# Patient Record
Sex: MALE | Race: WHITE | NOT HISPANIC OR LATINO | Employment: OTHER | URBAN - METROPOLITAN AREA
[De-identification: names, ages, dates, MRNs, and addresses within clinical notes are randomized per-mention and may not be internally consistent; named-entity substitution may affect disease eponyms.]

---

## 2023-09-01 ENCOUNTER — TELEPHONE (OUTPATIENT)
Dept: INTERNAL MEDICINE | Age: 83
End: 2023-09-01

## 2023-09-07 ENCOUNTER — E-ADVICE (OUTPATIENT)
Dept: INTERNAL MEDICINE | Age: 83
End: 2023-09-07

## 2023-09-07 RX ORDER — PHENOBARBITAL 60 MG/1
180 TABLET ORAL NIGHTLY
Qty: 90 TABLET | Refills: 0 | Status: CANCELLED | OUTPATIENT
Start: 2023-09-07

## 2023-09-07 RX ORDER — METOPROLOL SUCCINATE 50 MG/1
50 TABLET, EXTENDED RELEASE ORAL DAILY
Qty: 30 TABLET | Refills: 0 | Status: CANCELLED | OUTPATIENT
Start: 2023-09-07

## 2023-09-25 ENCOUNTER — TELEPHONE (OUTPATIENT)
Dept: FAMILY MEDICINE | Age: 83
End: 2023-09-25

## 2023-09-26 ENCOUNTER — APPOINTMENT (OUTPATIENT)
Dept: FAMILY MEDICINE | Age: 83
End: 2023-09-26

## 2023-09-27 ENCOUNTER — OFFICE VISIT (OUTPATIENT)
Dept: FAMILY MEDICINE | Age: 83
End: 2023-09-27

## 2023-09-27 VITALS
TEMPERATURE: 97.1 F | HEIGHT: 72 IN | DIASTOLIC BLOOD PRESSURE: 85 MMHG | BODY MASS INDEX: 24.5 KG/M2 | HEART RATE: 83 BPM | WEIGHT: 180.9 LBS | SYSTOLIC BLOOD PRESSURE: 145 MMHG | OXYGEN SATURATION: 97 %

## 2023-09-27 DIAGNOSIS — R56.9 NOCTURNAL SEIZURES (CMD): ICD-10-CM

## 2023-09-27 DIAGNOSIS — I10 PRIMARY HYPERTENSION: Primary | ICD-10-CM

## 2023-09-27 PROCEDURE — 99214 OFFICE O/P EST MOD 30 MIN: CPT | Performed by: STUDENT IN AN ORGANIZED HEALTH CARE EDUCATION/TRAINING PROGRAM

## 2023-09-27 RX ORDER — AMLODIPINE BESYLATE 5 MG/1
5 TABLET ORAL DAILY
Qty: 90 TABLET | Refills: 3 | Status: SHIPPED | OUTPATIENT
Start: 2023-09-27

## 2023-09-27 RX ORDER — PHENOBARBITAL 60 MG/1
1 TABLET ORAL DAILY
COMMUNITY
Start: 1983-01-01

## 2023-09-27 RX ORDER — METOPROLOL SUCCINATE 50 MG/1
50 TABLET, EXTENDED RELEASE ORAL DAILY
COMMUNITY
Start: 2023-09-26

## 2023-09-27 ASSESSMENT — ENCOUNTER SYMPTOMS
BACK PAIN: 0
EYE PAIN: 0
SORE THROAT: 0
FATIGUE: 0
EYE REDNESS: 0
NUMBNESS: 0
COUGH: 0
NAUSEA: 0
VOMITING: 0
CONSTIPATION: 0
FEVER: 0
SINUS PRESSURE: 0
HEADACHES: 0
ABDOMINAL PAIN: 0
CHILLS: 0
DIARRHEA: 0
SHORTNESS OF BREATH: 0
CHEST TIGHTNESS: 0
NERVOUS/ANXIOUS: 0

## 2024-01-11 DIAGNOSIS — R56.9 NOCTURNAL SEIZURES (CMD): Primary | ICD-10-CM

## 2024-01-15 RX ORDER — PHENOBARBITAL 60 MG/1
60 TABLET ORAL DAILY
Qty: 270 TABLET | Refills: 0 | OUTPATIENT
Start: 2024-01-15

## 2024-01-18 RX ORDER — PHENOBARBITAL 60 MG/1
60 TABLET ORAL 3 TIMES DAILY
Qty: 270 TABLET | Refills: 0 | Status: SHIPPED | OUTPATIENT
Start: 2024-01-18

## 2024-03-21 ENCOUNTER — TELEPHONE (OUTPATIENT)
Dept: INTERNAL MEDICINE | Age: 84
End: 2024-03-21

## 2024-04-25 DIAGNOSIS — R56.9 NOCTURNAL SEIZURES (CMD): ICD-10-CM

## 2024-04-25 RX ORDER — PHENOBARBITAL 60 MG/1
60 TABLET ORAL 3 TIMES DAILY
Qty: 270 TABLET | Refills: 0 | Status: SHIPPED | OUTPATIENT
Start: 2024-04-25

## 2024-05-24 ENCOUNTER — APPOINTMENT (OUTPATIENT)
Dept: CV DIAGNOSTICS | Age: 84
DRG: 322 | End: 2024-05-24
Attending: EMERGENCY MEDICINE

## 2024-05-24 ENCOUNTER — APPOINTMENT (OUTPATIENT)
Dept: GENERAL RADIOLOGY | Age: 84
DRG: 322 | End: 2024-05-24

## 2024-05-24 ENCOUNTER — HOSPITAL ENCOUNTER (INPATIENT)
Facility: HOSPITAL | Age: 84
LOS: 5 days | Discharge: HOME | DRG: 322 | End: 2024-05-29
Attending: EMERGENCY MEDICINE | Admitting: HOSPITALIST

## 2024-05-24 DIAGNOSIS — I15.9 SECONDARY HYPERTENSION: ICD-10-CM

## 2024-05-24 DIAGNOSIS — R94.39 ABNORMAL STRESS TEST: ICD-10-CM

## 2024-05-24 DIAGNOSIS — R07.9 CHEST PAIN OF UNCERTAIN ETIOLOGY: Primary | ICD-10-CM

## 2024-05-24 PROBLEM — R73.9 HYPERGLYCEMIA: Status: ACTIVE | Noted: 2024-05-24

## 2024-05-24 PROBLEM — R79.89 AZOTEMIA: Status: ACTIVE | Noted: 2024-05-24

## 2024-05-24 LAB
ALBUMIN SERPL-MCNC: 4 G/DL (ref 3.4–5)
ALBUMIN/GLOB SERPL: 1 {RATIO} (ref 1–2)
ALP LIVER SERPL-CCNC: 64 U/L
ALT SERPL-CCNC: 41 U/L
ANION GAP SERPL CALC-SCNC: 5 MMOL/L (ref 0–18)
ANION GAP SERPL CALC-SCNC: 6 MMOL/L (ref 0–18)
APTT PPP: 27.4 SECONDS (ref 23–36)
AST SERPL-CCNC: 18 U/L (ref 15–37)
ATRIAL RATE: 108 BPM
BASOPHILS # BLD AUTO: 0.02 X10(3) UL (ref 0–0.2)
BASOPHILS NFR BLD AUTO: 0.4 %
BILIRUB SERPL-MCNC: 0.4 MG/DL (ref 0.1–2)
BUN BLD-MCNC: 20 MG/DL (ref 9–23)
BUN BLD-MCNC: 23 MG/DL (ref 9–23)
CALCIUM BLD-MCNC: 8.6 MG/DL (ref 8.5–10.1)
CALCIUM BLD-MCNC: 9.4 MG/DL (ref 8.5–10.1)
CHLORIDE SERPL-SCNC: 106 MMOL/L (ref 98–112)
CHLORIDE SERPL-SCNC: 108 MMOL/L (ref 98–112)
CHOLEST SERPL-MCNC: 273 MG/DL (ref ?–200)
CO2 SERPL-SCNC: 26 MMOL/L (ref 21–32)
CO2 SERPL-SCNC: 26 MMOL/L (ref 21–32)
CREAT BLD-MCNC: 0.88 MG/DL
CREAT BLD-MCNC: 0.94 MG/DL
D DIMER PPP FEU-MCNC: <0.27 UG/ML FEU (ref ?–0.82)
EGFRCR SERPLBLD CKD-EPI 2021: 81 ML/MIN/1.73M2 (ref 60–?)
EGFRCR SERPLBLD CKD-EPI 2021: 86 ML/MIN/1.73M2 (ref 60–?)
EOSINOPHIL # BLD AUTO: 0.05 X10(3) UL (ref 0–0.7)
EOSINOPHIL NFR BLD AUTO: 0.9 %
ERYTHROCYTE [DISTWIDTH] IN BLOOD BY AUTOMATED COUNT: 12.9 %
ERYTHROCYTE [DISTWIDTH] IN BLOOD BY AUTOMATED COUNT: 13.2 %
GLOBULIN PLAS-MCNC: 3.9 G/DL (ref 2.8–4.4)
GLUCOSE BLD-MCNC: 112 MG/DL (ref 70–99)
GLUCOSE BLD-MCNC: 119 MG/DL (ref 70–99)
HCT VFR BLD AUTO: 41.6 %
HCT VFR BLD AUTO: 44.3 %
HDLC SERPL-MCNC: 91 MG/DL (ref 40–59)
HGB BLD-MCNC: 14.3 G/DL
HGB BLD-MCNC: 15.3 G/DL
IMM GRANULOCYTES # BLD AUTO: 0.02 X10(3) UL (ref 0–1)
IMM GRANULOCYTES NFR BLD: 0.4 %
LDLC SERPL CALC-MCNC: 159 MG/DL (ref ?–100)
LYMPHOCYTES # BLD AUTO: 0.95 X10(3) UL (ref 1–4)
LYMPHOCYTES NFR BLD AUTO: 16.8 %
MCH RBC QN AUTO: 32.9 PG (ref 26–34)
MCH RBC QN AUTO: 33 PG (ref 26–34)
MCHC RBC AUTO-ENTMCNC: 34.4 G/DL (ref 31–37)
MCHC RBC AUTO-ENTMCNC: 34.5 G/DL (ref 31–37)
MCV RBC AUTO: 95.6 FL
MCV RBC AUTO: 95.7 FL
MONOCYTES # BLD AUTO: 0.58 X10(3) UL (ref 0.1–1)
MONOCYTES NFR BLD AUTO: 10.2 %
NEUTROPHILS # BLD AUTO: 4.04 X10 (3) UL (ref 1.5–7.7)
NEUTROPHILS # BLD AUTO: 4.04 X10(3) UL (ref 1.5–7.7)
NEUTROPHILS NFR BLD AUTO: 71.3 %
NONHDLC SERPL-MCNC: 182 MG/DL (ref ?–130)
OSMOLALITY SERPL CALC.SUM OF ELEC: 291 MOSM/KG (ref 275–295)
OSMOLALITY SERPL CALC.SUM OF ELEC: 291 MOSM/KG (ref 275–295)
P AXIS: 51 DEGREES
P-R INTERVAL: 166 MS
PLATELET # BLD AUTO: 188 10(3)UL (ref 150–450)
PLATELET # BLD AUTO: 199 10(3)UL (ref 150–450)
POTASSIUM SERPL-SCNC: 3.8 MMOL/L (ref 3.5–5.1)
POTASSIUM SERPL-SCNC: 4 MMOL/L (ref 3.5–5.1)
PROT SERPL-MCNC: 7.9 G/DL (ref 6.4–8.2)
Q-T INTERVAL: 324 MS
QRS DURATION: 104 MS
QTC CALCULATION (BEZET): 434 MS
R AXIS: 23 DEGREES
RBC # BLD AUTO: 4.35 X10(6)UL
RBC # BLD AUTO: 4.63 X10(6)UL
SODIUM SERPL-SCNC: 138 MMOL/L (ref 136–145)
SODIUM SERPL-SCNC: 139 MMOL/L (ref 136–145)
T AXIS: 56 DEGREES
TRIGL SERPL-MCNC: 135 MG/DL (ref 30–149)
TROPONIN I SERPL HS-MCNC: 19 NG/L
TROPONIN I SERPL HS-MCNC: 789 NG/L
VENTRICULAR RATE: 108 BPM
VLDLC SERPL CALC-MCNC: 26 MG/DL (ref 0–30)
WBC # BLD AUTO: 5.7 X10(3) UL (ref 4–11)
WBC # BLD AUTO: 8.5 X10(3) UL (ref 4–11)

## 2024-05-24 PROCEDURE — 71045 X-RAY EXAM CHEST 1 VIEW: CPT

## 2024-05-24 PROCEDURE — 99223 1ST HOSP IP/OBS HIGH 75: CPT | Performed by: HOSPITALIST

## 2024-05-24 RX ORDER — ASPIRIN 81 MG/1
324 TABLET, CHEWABLE ORAL ONCE
Status: COMPLETED | OUTPATIENT
Start: 2024-05-24 | End: 2024-05-24

## 2024-05-24 RX ORDER — ONDANSETRON 2 MG/ML
4 INJECTION INTRAMUSCULAR; INTRAVENOUS EVERY 6 HOURS PRN
Status: DISCONTINUED | OUTPATIENT
Start: 2024-05-24 | End: 2024-05-29

## 2024-05-24 RX ORDER — SENNOSIDES 8.6 MG
17.2 TABLET ORAL NIGHTLY PRN
Status: DISCONTINUED | OUTPATIENT
Start: 2024-05-24 | End: 2024-05-29

## 2024-05-24 RX ORDER — ENEMA 19; 7 G/133ML; G/133ML
1 ENEMA RECTAL ONCE AS NEEDED
Status: DISCONTINUED | OUTPATIENT
Start: 2024-05-24 | End: 2024-05-29

## 2024-05-24 RX ORDER — NITROGLYCERIN 0.4 MG/1
0.4 TABLET SUBLINGUAL EVERY 5 MIN PRN
Status: DISCONTINUED | OUTPATIENT
Start: 2024-05-24 | End: 2024-05-29

## 2024-05-24 RX ORDER — ACETAMINOPHEN 500 MG
1000 TABLET ORAL EVERY 4 HOURS PRN
Status: DISCONTINUED | OUTPATIENT
Start: 2024-05-24 | End: 2024-05-29

## 2024-05-24 RX ORDER — HEPARIN SODIUM 1000 [USP'U]/ML
60 INJECTION, SOLUTION INTRAVENOUS; SUBCUTANEOUS ONCE
Status: COMPLETED | OUTPATIENT
Start: 2024-05-24 | End: 2024-05-24

## 2024-05-24 RX ORDER — METOCLOPRAMIDE HYDROCHLORIDE 5 MG/ML
10 INJECTION INTRAMUSCULAR; INTRAVENOUS EVERY 8 HOURS PRN
Status: DISCONTINUED | OUTPATIENT
Start: 2024-05-24 | End: 2024-05-29

## 2024-05-24 RX ORDER — POLYETHYLENE GLYCOL 3350 17 G/17G
17 POWDER, FOR SOLUTION ORAL DAILY PRN
Status: DISCONTINUED | OUTPATIENT
Start: 2024-05-24 | End: 2024-05-29

## 2024-05-24 RX ORDER — ASPIRIN 325 MG
325 TABLET ORAL DAILY
Status: DISCONTINUED | OUTPATIENT
Start: 2024-05-25 | End: 2024-05-28

## 2024-05-24 RX ORDER — ENOXAPARIN SODIUM 100 MG/ML
40 INJECTION SUBCUTANEOUS DAILY
Status: DISCONTINUED | OUTPATIENT
Start: 2024-05-25 | End: 2024-05-24

## 2024-05-24 RX ORDER — METOPROLOL TARTRATE 1 MG/ML
5 INJECTION, SOLUTION INTRAVENOUS ONCE
Status: COMPLETED | OUTPATIENT
Start: 2024-05-24 | End: 2024-05-24

## 2024-05-24 RX ORDER — BISACODYL 10 MG
10 SUPPOSITORY, RECTAL RECTAL
Status: DISCONTINUED | OUTPATIENT
Start: 2024-05-24 | End: 2024-05-29

## 2024-05-24 RX ORDER — LABETALOL HYDROCHLORIDE 5 MG/ML
10 INJECTION, SOLUTION INTRAVENOUS EVERY 4 HOURS PRN
Status: DISCONTINUED | OUTPATIENT
Start: 2024-05-24 | End: 2024-05-29

## 2024-05-24 RX ORDER — MELATONIN
3 NIGHTLY PRN
Status: DISCONTINUED | OUTPATIENT
Start: 2024-05-24 | End: 2024-05-29

## 2024-05-24 RX ORDER — AMLODIPINE BESYLATE 5 MG/1
5 TABLET ORAL DAILY
Status: ON HOLD | COMMUNITY
End: 2024-05-24

## 2024-05-24 RX ORDER — SPIRONOLACTONE 25 MG/1
12.5 TABLET ORAL DAILY
Status: DISCONTINUED | OUTPATIENT
Start: 2024-05-25 | End: 2024-05-29

## 2024-05-24 RX ORDER — HEPARIN SODIUM AND DEXTROSE 10000; 5 [USP'U]/100ML; G/100ML
INJECTION INTRAVENOUS CONTINUOUS
Status: DISCONTINUED | OUTPATIENT
Start: 2024-05-25 | End: 2024-05-29

## 2024-05-24 RX ORDER — HEPARIN SODIUM AND DEXTROSE 10000; 5 [USP'U]/100ML; G/100ML
12 INJECTION INTRAVENOUS ONCE
Status: COMPLETED | OUTPATIENT
Start: 2024-05-24 | End: 2024-05-24

## 2024-05-24 RX ORDER — PHENOBARBITAL 32.4 MG/1
180 TABLET ORAL NIGHTLY
Status: DISCONTINUED | OUTPATIENT
Start: 2024-05-25 | End: 2024-05-29

## 2024-05-24 RX ORDER — BENZONATATE 200 MG/1
200 CAPSULE ORAL 3 TIMES DAILY PRN
Status: DISCONTINUED | OUTPATIENT
Start: 2024-05-24 | End: 2024-05-29

## 2024-05-24 NOTE — ED INITIAL ASSESSMENT (HPI)
To er with sternal chest pain that is intermittent over the past week.  States pain became \"more intense\"  today.

## 2024-05-24 NOTE — ED PROVIDER NOTES
Patient Seen in: [facility] In [place]      History     Chief Complaint   Patient presents with    Chest Pain Angina     Stated Complaint: chest pain    Subjective:   HPI    Patient comes to the emergency department with what patient describes as a pressure in his chest occurring intermittently over the past several days.  Each episode lasts for approximately 10 minutes at a time and occurs paroxysmally.  The patient states that the symptoms are not related to any sort of physical exertion.  There is no discomfort related with chest wall movement or deep inspiration.  He has no previous history of cardiac disease, but states that the symptoms are reminiscent of discomfort that he has had in the past when he had his spine manipulated by chiropractor.  At the time of my interview of the patient, the patient had no chest discomfort.  He does state that the intensity and frequency of the discomfort has been increasing somewhat over the past few days.  He has had no associated nausea, diaphoresis, dyspnea, palpitation, syncope or near syncope.    Objective:   Past Medical History:    Essential hypertension    Seizure disorder (HCC)              History reviewed. No pertinent surgical history.             Social History     Socioeconomic History    Marital status:    Tobacco Use    Smoking status: Never    Smokeless tobacco: Never   Vaping Use    Vaping status: Never Used              Review of Systems    Positive for stated complaint: chest pain  Other systems are as noted in HPI.  Constitutional and vital signs reviewed.      All other systems reviewed and negative except as noted above.    Physical Exam     ED Triage Vitals   BP 05/24/24 1221 (!) 191/97   Pulse 05/24/24 1220 107   Resp 05/24/24 1220 13   Temp 05/24/24 1220 97.8 °F (36.6 °C)   Temp src 05/24/24 1220 Temporal   SpO2 05/24/24 1220 99 %   O2 Device 05/24/24 1220 None (Room air)       Current Vitals:   Vital Signs  BP: (!)  162/108  Pulse: 99  Resp: 16  Temp: 97.8 °F (36.6 °C)  Temp src: Temporal    Oxygen Therapy  SpO2: 97 %  O2 Device: None (Room air)            Physical Exam  Vitals and nursing note reviewed.   Constitutional:       Appearance: He is well-developed.   HENT:      Head: Normocephalic.   Cardiovascular:      Rate and Rhythm: Normal rate and regular rhythm.      Heart sounds: Normal heart sounds. No murmur heard.  Pulmonary:      Effort: Pulmonary effort is normal.      Breath sounds: Normal breath sounds.   Abdominal:      General: Bowel sounds are normal.      Palpations: Abdomen is soft.      Tenderness: There is no abdominal tenderness. There is no guarding.   Musculoskeletal:         General: No tenderness. Normal range of motion.      Cervical back: Normal range of motion and neck supple.   Lymphadenopathy:      Cervical: No cervical adenopathy.   Skin:     General: Skin is warm and dry.      Findings: No rash.   Neurological:      Mental Status: He is alert and oriented to person, place, and time.      Sensory: No sensory deficit.              ED Course     Labs Reviewed   COMP METABOLIC PANEL (14) - Abnormal; Notable for the following components:       Result Value    Glucose 119 (*)     All other components within normal limits   CBC W/ DIFFERENTIAL - Abnormal; Notable for the following components:    Lymphocyte Absolute 0.95 (*)     All other components within normal limits   TROPONIN I HIGH SENSITIVITY - Normal   D-DIMER - Normal   CBC WITH DIFFERENTIAL WITH PLATELET    Narrative:     The following orders were created for panel order CBC With Differential With Platelet.  Procedure                               Abnormality         Status                     ---------                               -----------         ------                     CBC W/ DIFFERENTIAL[[id number]]          Abnormal            Final result                 Please view results for these tests on the individual orders.   ZAIRA MOTT  LAVENDER   RAINBOW DRAW LIGHT GREEN   RAINBOW DRAW BLUE     EKG    Rate, intervals and axes as noted on EKG Report.  Rate: 108  Rhythm: Sinus Rhythm  Reading: Sinus tachycardia.  Frequent PVCs.              ED Course as of 05/24/24 1626  ------------------------------------------------------------  Time: 05/24 1354  Value: D-Dimer: <0.27  Comment: D-dimer was performed to evaluate for differential of pulmonary embolus because of his tachycardia.  D-dimer was normal.  ------------------------------------------------------------  Time: 05/24 1418  Value: Troponin I (High Sensitivity): 19  Comment: Troponin was performed to evaluate for differential of myocardial ischemia.  It was normal.  ------------------------------------------------------------  Time: 05/24 1418  Comment: Chemistries and CBC were unremarkable.  ------------------------------------------------------------  Time: 05/24 1421  Comment: I spoke with cardiology who was in agreement that the patient would be a good candidate for stress testing.  Arrangements for stress echocardiogram were made to be performed this afternoon.              MDM      Patient comes to the emergency department with intermittent chest pressure occurring over the past week.  Differential diagnosis includes acute myocardial ischemia and pulmonary embolus.  Workup in the emergency department thus far has been negative.  Given the patient's presentation, I consulted cardiology to evaluate the patient using stress echocardiogram.  They were agreeable and currently results are still pending.                                   Medical Decision Making      Disposition and Plan     Clinical Impression:  1. Chest pain of uncertain etiology         Disposition:  Disposition is still currently pending final stress test results.  If stress test is negative, it is anticipated the patient will be discharged home with follow-up with his primary care physician in [place]  [place].    Follow-up:  Jermaine Gr  [phone] (Work)  [phone] (Fax)  [address] [place] [address]  Call in 4 day(s)            Medications Prescribed:  Current Discharge Medication List

## 2024-05-24 NOTE — ED QUICK NOTES
Orders for admission, patient is aware of plan and ready to go upstairs. Any questions, please call ED RN Stacie at extension [phone].     Patient Covid vaccination status: Unvaccinated     COVID Test Ordered in ED: None    COVID Suspicion at Admission: N/A    Running Infusions:  None    Mental Status/LOC at time of transport: alertx4, awake, pleasant    Other pertinent information:   CIWA score: N/A   NIH score:  N/A

## 2024-05-25 ENCOUNTER — APPOINTMENT (OUTPATIENT)
Dept: ULTRASOUND IMAGING | Facility: HOSPITAL | Age: 84
DRG: 322 | End: 2024-05-25
Attending: HOSPITALIST

## 2024-05-25 ENCOUNTER — APPOINTMENT (OUTPATIENT)
Dept: CV DIAGNOSTICS | Facility: HOSPITAL | Age: 84
DRG: 322 | End: 2024-05-25
Attending: INTERNAL MEDICINE

## 2024-05-25 PROBLEM — I21.4 NSTEMI (NON-ST ELEVATED MYOCARDIAL INFARCTION) (HCC): Status: ACTIVE | Noted: 2024-05-25

## 2024-05-25 LAB
ANION GAP SERPL CALC-SCNC: 8 MMOL/L (ref 0–18)
APTT PPP: 71.4 SECONDS (ref 23–36)
BASOPHILS # BLD AUTO: 0.03 X10(3) UL (ref 0–0.2)
BASOPHILS NFR BLD AUTO: 0.6 %
BUN BLD-MCNC: 20 MG/DL (ref 9–23)
CALCIUM BLD-MCNC: 8.4 MG/DL (ref 8.5–10.1)
CHLORIDE SERPL-SCNC: 109 MMOL/L (ref 98–112)
CHOLEST SERPL-MCNC: 260 MG/DL (ref ?–200)
CO2 SERPL-SCNC: 23 MMOL/L (ref 21–32)
CREAT BLD-MCNC: 0.74 MG/DL
EGFRCR SERPLBLD CKD-EPI 2021: 90 ML/MIN/1.73M2 (ref 60–?)
EOSINOPHIL # BLD AUTO: 0.11 X10(3) UL (ref 0–0.7)
EOSINOPHIL NFR BLD AUTO: 2 %
ERYTHROCYTE [DISTWIDTH] IN BLOOD BY AUTOMATED COUNT: 12.9 %
GLUCOSE BLD-MCNC: 104 MG/DL (ref 70–99)
HCT VFR BLD AUTO: 40.4 %
HDLC SERPL-MCNC: 79 MG/DL (ref 40–59)
HGB BLD-MCNC: 14.2 G/DL
IMM GRANULOCYTES # BLD AUTO: 0.02 X10(3) UL (ref 0–1)
IMM GRANULOCYTES NFR BLD: 0.4 %
LDLC SERPL CALC-MCNC: 159 MG/DL (ref ?–100)
LYMPHOCYTES # BLD AUTO: 1.14 X10(3) UL (ref 1–4)
LYMPHOCYTES NFR BLD AUTO: 20.9 %
MCH RBC QN AUTO: 32.9 PG (ref 26–34)
MCHC RBC AUTO-ENTMCNC: 35.1 G/DL (ref 31–37)
MCV RBC AUTO: 93.5 FL
MONOCYTES # BLD AUTO: 0.53 X10(3) UL (ref 0.1–1)
MONOCYTES NFR BLD AUTO: 9.7 %
NEUTROPHILS # BLD AUTO: 3.62 X10 (3) UL (ref 1.5–7.7)
NEUTROPHILS # BLD AUTO: 3.62 X10(3) UL (ref 1.5–7.7)
NEUTROPHILS NFR BLD AUTO: 66.4 %
NONHDLC SERPL-MCNC: 181 MG/DL (ref ?–130)
OSMOLALITY SERPL CALC.SUM OF ELEC: 293 MOSM/KG (ref 275–295)
PLATELET # BLD AUTO: 167 10(3)UL (ref 150–450)
POTASSIUM SERPL-SCNC: 3.6 MMOL/L (ref 3.5–5.1)
POTASSIUM SERPL-SCNC: 4.4 MMOL/L (ref 3.5–5.1)
RBC # BLD AUTO: 4.32 X10(6)UL
SODIUM SERPL-SCNC: 140 MMOL/L (ref 136–145)
TRIGL SERPL-MCNC: 126 MG/DL (ref 30–149)
TROPONIN I SERPL HS-MCNC: 608 NG/L
TSI SER-ACNC: 2.15 MIU/ML (ref 0.36–3.74)
VLDLC SERPL CALC-MCNC: 24 MG/DL (ref 0–30)
WBC # BLD AUTO: 5.5 X10(3) UL (ref 4–11)

## 2024-05-25 PROCEDURE — 93975 VASCULAR STUDY: CPT | Performed by: HOSPITALIST

## 2024-05-25 PROCEDURE — 93306 TTE W/DOPPLER COMPLETE: CPT | Performed by: INTERNAL MEDICINE

## 2024-05-25 PROCEDURE — 99232 SBSQ HOSP IP/OBS MODERATE 35: CPT | Performed by: HOSPITALIST

## 2024-05-25 PROCEDURE — 76775 US EXAM ABDO BACK WALL LIM: CPT | Performed by: HOSPITALIST

## 2024-05-25 RX ORDER — LOSARTAN POTASSIUM 25 MG/1
12.5 TABLET ORAL DAILY
Status: DISCONTINUED | OUTPATIENT
Start: 2024-05-25 | End: 2024-05-29

## 2024-05-25 RX ORDER — ATORVASTATIN CALCIUM 40 MG/1
40 TABLET, FILM COATED ORAL NIGHTLY
Status: DISCONTINUED | OUTPATIENT
Start: 2024-05-25 | End: 2024-05-29

## 2024-05-25 RX ORDER — POTASSIUM CHLORIDE 20 MEQ/1
40 TABLET, EXTENDED RELEASE ORAL EVERY 4 HOURS
Status: COMPLETED | OUTPATIENT
Start: 2024-05-25 | End: 2024-05-25

## 2024-05-25 NOTE — PLAN OF CARE
NURSING ADMISSION NOTE      Patient admitted via Ambulance  Oriented to room.  Safety precautions initiated.  Bed in low position.  Call light in reach.      Received patient from [facility] ED. Patient is alert and oriented x 4. Maintaining O2 saturation WNL on room air. NSR on tele monitor. Skin check done, skin clean, dry and intact. Heparin gtt started per order. ACS/afib protocol. No complains of pian, shortness of breath, nausea or vomiting. Ambulating with stand by assist. Patient aware of plan of care. Safety precautions in place, call night within reach. All needs met at this time.     POC:   Renal US   ECHO   Troponin trend   Medications adjusted per cardiology   NPO for renal US

## 2024-05-25 NOTE — OCCUPATIONAL THERAPY NOTE
OCCUPATIONAL THERAPY EVALUATION - INPATIENT    Room Number: 2614/2614-A  Evaluation Date: 5/25/2024     Type of Evaluation: Initial  Presenting Problem: chest tightness    Physician Order: IP Consult to Occupational Therapy  Reason for Therapy:  ADL/IADL Dysfunction and Discharge Planning      OCCUPATIONAL THERAPY ASSESSMENT   Patient is a 83 year old male admitted on 5/24/2024 with Presenting Problem: chest tightness. Co-Morbidities : HTN, remote hx of sz  Patient is currently functioning at baseline with toileting, upper body dressing, lower body dressing, grooming, bed mobility, transfers, stating sitting balance, dynamic sitting balance, static standing balance, dynamic standing balance, maintaining seated position, and functional standing tolerance.  Prior to admission, patient's baseline is Mod I.  Patient met all OT goals at Sup level.  Patient reports no further questions/concerns at this time.         WEIGHT BEARING RESTRICTION  Weight Bearing Restriction: None                Recommendations for nursing staff:   Transfers: Sup  Toileting location: Toilet    EVALUATION SESSION:  Patient at start of session: supine in bed for session  FUNCTIONAL TRANSFER ASSESSMENT  Sit to Stand: Edge of Bed  Edge of Bed: Supervision  Toilet Transfer: Supervision    BED MOBILITY  Rolling: Supervision  Supine to Sit : Supervision  Sit to Supine (OT): Supervision  Scooting: Sup to EOB    BALANCE ASSESSMENT  Static Sitting: Supervision  Sitting Bilateral: Supervision  Static Standing: Supervision  Standing Bilateral: Supervision    FUNCTIONAL ADL ASSESSMENT  Grooming Standing: Supervision (at sink)  UB Dressing Seated: Supervision (for second robe)  LB Dressing Seated: Supervision (for shoes and socks at EOB)  Toileting Seated: Supervision (at std toilet)      ACTIVITY TOLERANCE: vitals stable                         O2 SATURATIONS       COGNITION  Overall Cognitive Status:  WFL - within functional limits  COGNITION  ASSESSMENTS       Upper Extremity:   ROM: within functional limits   Strength: is within functional limits   Coordination:  Gross motor: WNL  Fine motor: WNL   Sensation: Light touch:  intact    EDUCATION PROVIDED  Patient: Role of Occupational Therapy; Plan of Care  Patient's Response to Education: Verbalized Understanding; Returned Demonstration    Equipment used: RW  Demonstrates functional use    Patient End of Session: In bed;Needs met;Call light within reach;All patient questions and concerns addressed;Family present    OCCUPATIONAL PROFILE    HOME SITUATION  Type of Home: House  Home Layout: Two level  Lives With: Spouse    Toilet and Equipment: Standard height toilet  Shower/Tub and Equipment: Walk-in shower  Other Equipment: None    Occupation/Status: own [employer]     Drives: Yes  Patient Regularly Uses: Glasses    Prior Level of Function: Pt typically independent with ADLs and mobility. Pt does not use AD.    SUBJECTIVE  Pt stated, \"I am doing well.\"    PAIN ASSESSMENT  Ratin  Location: no pain at this time       OBJECTIVE  Precautions: None  Fall Risk: Standard fall risk    WEIGHT BEARING RESTRICTION  Weight Bearing Restriction: None                AM-PAC ‘6-Clicks’ Inpatient Daily Activity Short Form  -   Putting on and taking off regular lower body clothing?: A Little  -   Bathing (including washing, rinsing, drying)?: A Little  -   Toileting, which includes using toilet, bedpan or urinal? : A Little  -   Putting on and taking off regular upper body clothing?: A Little  -   Taking care of personal grooming such as brushing teeth?: A Little  -   Eating meals?: A Little    AM-PAC Score:  Score: 18  Approx Degree of Impairment: 46.65%  Standardized Score (AM-PAC Scale): 38.66      ADDITIONAL TESTS     NEUROLOGICAL FINDINGS        PLAN   Patient has been evaluated and presents with no skilled Occupational Therapy needs at this time.  Patient discharged from Occupational Therapy services.  Please  re-order if a new functional limitation presents during this admission.      Patient Evaluation Complexity Level:   Occupational Profile/Medical History LOW - Brief history including review of medical or therapy records    Specific performance deficits impacting engagement in ADL/IADL LOW  1 - 3 performance deficits    Client Assessment/Performance Deficits LOW - No comorbidities nor modifications of tasks    Clinical Decision Making LOW - Analysis of occupational profile, problem-focused assessments, limited treatment options    Overall Complexity LOW     OT Session Time: 20 minutes  Self-Care Home Management: 10 minutes  Therapeutic Activity: 0 minutes  Neuromuscular Re-education: 0 minutes  Therapeutic Exercise: 0 minutes  Cognitive Skills: 0 minutes  Sensory Integrative: 0 minutes  Orthotic Management and Trainin minutes  Can add/delete any of these

## 2024-05-25 NOTE — CONSULTS
[facility]  Report of Consultation    John Mojica Patient Status:  Inpatient    1940 MRN [id number]   Location [facility] 2NE-A Attending Taran Marie MD   Hosp Day # 0 PCP Jermaine Gr     Reason for Consultation:  Intermittent chest pain  Untreated hypertension  Hypertensive crisis  PVCs    History of Present Illness:  John Mojica is a a(n) 83 year old male with no prior cardiac history but untreated hypertension and remote seizures, presented with intermittent chest pain/pressure over the last week which was more intense today.  Episodes of chest pain can last up to few minutes.  Chest pain episodes are nonexertional.  They come at rest.  He denies pleuritic chest pain.  Initially he thought that this is from the spine with similar symptoms in the past when he was going to chiropractic.  No nausea or sweats.  No palpitations or leg edema.  No dizziness or fainting episode.    He is asymptomatic in telemetry now.  He denies chest pain.  Blood pressure improved.    Blood pressure was treated with metoprolol and then amlodipine prescribed by PCP last year but he does not remember the dose.    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    Importantly his blood pressure was 190/100 mmHg in ER.  His initial cardiac workup like EKG and troponin were negative and  stress echo was ordered.    Patient underwent resting stress echo images but stress echo was canceled and not completed due to baseline abnormal resting echo images showing severe reduction in LV systolic function.    Stress echo was canceled based on resting echo images because it would be nondiagnostic.  Very high blood pressure during initial echo images addressed.    Dr. Bruce called ER physician and patient was admitted from [facility] to [facility] for further cardiac workup.    Telemetry: Stable sinus with occasional PVCs.    Important labs: Troponin negative, D-dimer negative  and glucose 119.  CBC unremarkable.    EKG: Sinus tachycardia 108 bpm with PVCs and nonspecific changes but no acute ischemia.      No prior cardiac procedures or recent cardiac testing.    History:  Past Medical History:    Essential hypertension    Seizure disorder (HCC)     No major surgeries.    Family history: No early CAD.    Social History:   reports that he has never smoked. He has never used smokeless tobacco.    Allergies:  No Known Allergies    Medications:    Current Facility-Administered Medications:     [START ON 5/25/2024] aspirin tab 325 mg, 325 mg, Oral, Daily    nitroglycerin (Nitrostat) SL tab 0.4 mg, 0.4 mg, Sublingual, Q5 Min PRN    acetaminophen (Tylenol Extra Strength) tab 1,000 mg, 1,000 mg, Oral, Q4H PRN    melatonin tab 3 mg, 3 mg, Oral, Nightly PRN    polyethylene glycol (PEG 3350) (Miralax) 17 g oral packet 17 g, 17 g, Oral, Daily PRN    sennosides (Senokot) tab 17.2 mg, 17.2 mg, Oral, Nightly PRN    bisacodyl (Dulcolax) 10 MG rectal suppository 10 mg, 10 mg, Rectal, Daily PRN    fleet enema (Fleet) 7-19 GM/118ML rectal enema 133 mL, 1 enema, Rectal, Once PRN    ondansetron (Zofran) 4 MG/2ML injection 4 mg, 4 mg, Intravenous, Q6H PRN    metoclopramide (Reglan) 5 mg/mL injection 10 mg, 10 mg, Intravenous, Q8H PRN    benzonatate (Tessalon) cap 200 mg, 200 mg, Oral, TID PRN    [START ON 5/25/2024] enoxaparin (Lovenox) 40 MG/0.4ML SUBQ injection 40 mg, 40 mg, Subcutaneous, Daily    labetalol (Trandate) 5 mg/mL injection 10 mg, 10 mg, Intravenous, Q4H PRN    Review of Systems:     Constitutional: Negative for fever or chills. No significant weight changes.  Eyes: Patient denies significant visual changes.  Ears, Nose, Mouth, Throat:  Negative for any new hearing loss. No sore throat. No nasal congestion.  Cardiovascular: see HPI -hypertension, untreated.  Recurrent chest pain over the last week.  Respiratory: No cough, wheezing or hemoptysis, no dyspnea.  Hematologic/Lymphatic: No easy  bruising or bleeding.   Integumentary: No rash or suspicious lesions on the skin.   Musculoskeletal: No arthritis or myalgias.  Gastrointestinal: Negative for any bleeding. No abdominal pain. No diarrhea.  Genitourinary: No hematuria.   Neurological: Alert and oriented, no headaches, no focal weakness or new paresthesias.  Remote seizures in the past.  Allergic/Immunologic: no rhinitis or environmental allergies      Physical Exam:  Blood pressure 149/89, pulse 86, temperature 97.8 °F (36.6 °C), temperature source Temporal, resp. rate 16, height 72\", weight 179 lb 7.3 oz (81.4 kg), SpO2 95%.  Temp (24hrs), Av.8 °F (36.6 °C), Min:97.8 °F (36.6 °C), Max:97.8 °F (36.6 °C)    Wt Readings from Last 3 Encounters:   24 179 lb 7.3 oz (81.4 kg)       Constitutional: Normal appearance. No apparent distress.  Eyes: Moist conjunctivae, PERRLA.  Ears, Nose, Mouth, Throat:  Moist mucosa. Anicteric sclera. Oropharynx clear.  Head and Neck: No JVD, carotids 2+ no bruits. Neck supple. No thyromegaly or adenopathy. Normocephalic head.  Cardiovascular: Regular rate and rhythm, S1, S2 normal, 1/6 systolic murmur, intermittent gallop and no rub.  Respiratory: Clear lungs without wheezes, rales, rhonchi or dullness.  Normal excursions and effort.  Gastrointestinal: Soft, non-tender abdomen.   Extremities: Without clubbing, cyanosis or edema.  Peripheral pulses are 2+.  Neurologic: Alert and oriented x3.  Cranial nerves intact. Normal sensation and motor function. No focal deficits.  Musculoskeletal: normal range of motion, normal muscle strength, no joint effusion.   Integumentary: Warm and dry skin. No rashes.  Psychiatric: Depression.    Laboratories and Data:    Imaging:  XR CHEST AP PORTABLE  (CPT=71045)    Result Date: 2024  PROCEDURE:  XR CHEST AP PORTABLE  (CPT=71045)  TECHNIQUE:  AP chest radiograph was obtained.  COMPARISON:  None.  INDICATIONS:  chest pain  PATIENT STATED HISTORY: (As transcribed by Technologist)   Pt has a muscular type pain starting on his left chest radiating to the center.  He has a thoracic spine injury from an accident years ago.     FINDINGS:  The lungs are clear.  Cardiomediastinal silhouette and vascularity are unremarkable.  No significant osseous abnormalities.            CONCLUSION:  Exam is within normal limits.          Labs:        Lab Results   Component Value Date    WBC 5.7 05/24/2024    HGB 15.3 05/24/2024    HCT 44.3 05/24/2024    .0 05/24/2024    CREATSERUM 0.94 05/24/2024    BUN 23 05/24/2024     05/24/2024    K 4.0 05/24/2024     05/24/2024    CO2 26.0 05/24/2024     05/24/2024    CA 9.4 05/24/2024    ALB 4.0 05/24/2024    ALKPHO 64 05/24/2024    BILT 0.4 05/24/2024    TP 7.9 05/24/2024    AST 18 05/24/2024    ALT 41 05/24/2024    DDIMER <0.27 05/24/2024     Impression:  Hypertensive crisis.  Patient was on metoprolol and then switched to amlodipine in last year.  Intermittent few min episodes of chest pain over the last week.  First troponin negative.  No ischemia or pathological Q waves in EKG in ER.  D-dimer negative.  Abnormal resting echo images in preparation for stress echo but stress echo was never completed.  Stress echo was canceled due to abnormal resting echo images showing severe reduction in LV systolic function.  New LV systolic dysfunction -found by resting stress echo images today but unknown duration.  Frequent PVCs.  Anxiety.  Remote seizures.    Plan:  -Telemetry admit  -Follow troponins -the first troponin was negative but the next troponins may become positive with malignant hypertension and low EF  -Obtain full echo Doppler study in a.m. - especially when blood pressure will be better controlled since it was 190s /100 mmHg in ER  when stress echo was started and canceled after abnormal initial resting echo images  -Blood pressure control overnight -started BP medications  -I will discontinue amlodipine due to new diagnosis of LV  dysfunction  -Start metoprolol tartrate 25 mg p.o. twice a day tonight and later will change to long-acting beta-blocker due to low EF   -Beta-blocker should help with PVCs not only low EF  -Start low-dose losartan 12.5 mg p.o. daily  -Start low-dose spironolactone 12.5 mg p.o. daily  -BMP in the morning  -As needed IV hydralazine on top of p.o. meds  -With reduced LV ejection fraction stress test is not the best option here  -troponin negative so far but the next troponin  may become positive with myocardial stretch and high blood pressure 190 over 100 mmHg and low EF  -If troponins continue to be negative patient may be discharged home on blood pressure medication with outpatient coronary workup  -But if troponins becomes positive he will need to stay for right and left heart catheterization on Tuesday  -Check TSH  -Renal ultrasound to rule out renal artery stenosis and assess kidney sizes in a.m.  -Stress echo was never completed and canceled due to abnormal resting echo images with global LV dysfunction and EF of 25 to 30%    Patient is asymptomatic now but should not go home and needs further cardiac workup    Discussed with the patient, Hospitalist and the Nursing Staff    Thank you for consult    Anurag Morton M.D.  [facility]    5/24/2024  8:05 PM  C5

## 2024-05-25 NOTE — PROGRESS NOTES
[facility]   part of [place] Hospitalist Progress Note     John Mojica Patient Status:  Inpatient    1940 MRN [id number]   Formerly [facility] 2NE-A Attending Jamie Hopson MD   Hosp Day # 1 PCP No primary care provider on file.     Subjective:   No more CP nor SOB.     Objective:    Review of Systems:   A comprehensive review of systems was completed; pertinent positive and negatives stated in subjective.  Vital signs:  Temp:  [97.5 °F (36.4 °C)-98 °F (36.7 °C)] 97.5 °F (36.4 °C)  Pulse:  [] 71  Resp:  [13-22] 22  BP: (109-191)/() 109/73  SpO2:  [91 %-99 %] 95 %  Physical Exam:    General: No acute distress   Respiratory: no wheezes, no rhonchi  Cardiovascular: S1, S2, RRR  Abdomen: Soft, NT/ND, +BS  Extremities: no edema    Diagnostic Data:    Labs:  Recent Labs   Lab 24  1222 24  2234 24  0607   WBC 5.7 8.5 5.5   HGB 15.3 14.3 14.2   MCV 95.7 95.6 93.5   .0 188.0 167.0     Recent Labs   Lab 24  1222 24  2234   * 112*   BUN 23 20   CREATSERUM 0.94 0.88   CA 9.4 8.6   ALB 4.0  --     139   K 4.0 3.8    108   CO2 26.0 26.0   ALKPHO 64  --    AST 18  --    ALT 41  --    BILT 0.4  --    TP 7.9  --      Estimated Creatinine Clearance: 71 mL/min (based on SCr of 0.88 mg/dL).  No results for input(s): \"PTP\", \"INR\" in the last 168 hours.     Microbiology  No results found for this visit on 24.  Imaging: Reviewed in Epic.  Medications:    aspirin  325 mg Oral Daily    metoprolol tartrate  25 mg Oral 2x Daily(Beta Blocker)    losartan  12.5 mg Oral Daily    spironolactone  12.5 mg Oral Daily    PHENobarbital  194.4 mg Oral Nightly       Assessment & Plan:    # Chest pain- concern for UA/NSTEMI as troponin 19 on admission and now 789/608  -Cardiology consult appreciated  -Patient was sent for stress echo but was aborted due to low EF on resting images  -Aspirin, BB, losartan, spironolactone   -Heparin gtt started with  trop elevation  -angiogram- timing per Cardiology   -f/u ECHO      #Hypertensive urgency  -appreciate meds adjusted per Dr. Morton  -renal US doppler to r/o ANNA     #Seizure disorder- phenobarbital   #Anxiety- contributing to his symptoms- will consider medical tx if worsens         Jamie Hopson MD  Supplementary Documentation:   Quality:  DVT Mechanical Prophylaxis:   SCDs,    DVT Pharmacologic Prophylaxis   Medication    heparin (Porcine) 41170 units/250mL infusion ACS/AFIB CONTINUOUS    DVT Pharmacologic prophylaxis: Aspirin 325 mg           Code Status: Not on file  Michael: No urinary catheter in place  Michael Duration (in days):   Central line:    MI:   At this point Mr. Mojica is expected to be discharge to: home     The 21st Century Cures Act makes medical notes like these available to patients in the interest of transparency. Please be advised this is a medical document. Medical documents are intended to carry relevant information, facts as evident, and the clinical opinion of the practitioner. The medical note is intended as peer to peer communication and may appear blunt or direct. It is written in medical language and may contain abbreviations or verbiage that are unfamiliar.

## 2024-05-25 NOTE — H&P
[facility]     John Mojica Patient Status:  Inpatient    1940 MRN [id number]   Location [facility] 2NE-A Attending Taran Marie MD   Hosp Day # 0 PCP Jermaine Gr     Chief Complaint: Chest pain    Subjective:    History of Present Illness:     John Mojica is a 83 year old male with past medical history hypertension, seizure disorder who presents with chest pain.  Started several days ago and occurs intermittently.  Last about 10 minutes.  Nonexertional.  Nonpleuritic.  Intensity and frequency have been increasing over the last couple of days.  Denies any fever, chills, diaphoresis, nausea/vomiting, shortness of breath, palpitations, abdominal pain.    History/Other:    Past Medical History:  Past Medical History:    Essential hypertension    Seizure disorder (HCC)     Past Surgical History:   History reviewed. No pertinent surgical history.   Family History:   No family history on file.  Social History:    reports that he has never smoked. He has never used smokeless tobacco.     Allergies: No Known Allergies    Medications:    No current facility-administered medications on file prior to encounter.     Current Outpatient Medications on File Prior to Encounter   Medication Sig Dispense Refill    UNKNOWN TO PATIENT - BLOOD PRESSURE       PHENOBARBITAL OR Take by mouth.         Review of Systems:   A comprehensive review of systems was completed.    Pertinent positives and negatives noted in the HPI.    Objective:   Physical Exam:    /89 (BP Location: Left arm)   Pulse 86   Temp 97.8 °F (36.6 °C) (Temporal)   Resp 16   Ht 6' (1.829 m)   Wt 179 lb 7.3 oz (81.4 kg)   SpO2 95%   BMI 24.34 kg/m²   General: No acute distress, Alert  Respiratory: No rhonchi, no wheezes  Cardiovascular: S1, S2. Regular rate and rhythm  Abdomen: Soft, Non-tender, non-distended, positive bowel sounds  Neuro: No new focal deficits  Extremities: No edema      Results:     Labs:      Labs Last 24 Hours:    Recent Labs   Lab 05/24/24  1222   RBC 4.63   HGB 15.3   HCT 44.3   MCV 95.7   MCH 33.0   MCHC 34.5   RDW 13.2   NEPRELIM 4.04   WBC 5.7   .0       Recent Labs   Lab 05/24/24  1222   *   BUN 23   CREATSERUM 0.94   EGFRCR 81   CA 9.4   ALB 4.0      K 4.0      CO2 26.0   ALKPHO 64   AST 18   ALT 41   BILT 0.4   TP 7.9       No results found for: \"PT\", \"INR\"    Recent Labs   Lab 05/24/24  1222   TROPHS 19       No results for input(s): \"TROP\", \"PBNP\" in the last 168 hours.    No results for input(s): \"PCT\" in the last 168 hours.    Imaging: Imaging data reviewed in Epic.    Assessment & Plan:      # Chest pain concern for angina  -Cardiology consult  -Patient was sent for stress echo but was aborted due to low EF on resting images  -Trend troponins  -Aspirin  -Lipid panel  -N.p.o. after midnight   -D-dimer negative  -d/w dr bravo, did not think angio was necessary. Echo in AM. EF was 25%. Requested renal US r/o ANNA.    #Hypertensive urgency  -on amlodipine at home  -hold ccb. Cards to start bb, acei, spironolactone  -As needed labetalol    #Seizure disorder        Plan of care discussed with patient, ED physician    Taran Marie MD    Supplementary Documentation:     The 21st Century Cures Act makes medical notes like these available to patients in the interest of transparency. Please be advised this is a medical document. Medical documents are intended to carry relevant information, facts as evident, and the clinical opinion of the practitioner. The medical note is intended as peer to peer communication and may appear blunt or direct. It is written in medical language and may contain abbreviations or verbiage that are unfamiliar.               **Certification      PHYSICIAN Certification of Need for Inpatient Hospitalization - Initial Certification    Patient will require inpatient services that will reasonably be expected to span two midnight's based on  the clinical documentation in H+P.   Based on patients current state of illness, I anticipate that, after discharge, patient will require TBD.

## 2024-05-25 NOTE — PHYSICAL THERAPY NOTE
PT orders received, chart reviewed.  Pt was off the floor for US earlier this AM.  Attempted to see pt, per RN pt is occupied with echo at this time, will attempt to see pt at a later time as appropriate.

## 2024-05-25 NOTE — PHYSICAL THERAPY NOTE
PHYSICAL THERAPY EVALUATION - INPATIENT     Room Number: 2614/2614-A  Evaluation Date: 2024  Type of Evaluation: Initial  Physician Order: PT Eval and Treat    Presenting Problem: chest pain of uncertain etiology  Co-Morbidities : HTN, remote hx of sz  Reason for Therapy: Mobility Dysfunction and Discharge Planning    PHYSICAL THERAPY ASSESSMENT   Patient is a 83 year old male admitted 2024 for chest pain.   Patient is currently functioning at baseline with bed mobility, transfers, gait, and stair negotiation. Prior to admission, patient's baseline is independent without assistive device.     Patient will benefit from continued skilled PT Services For duration of hospitalization, however, given the patient is functioning near baseline level do not anticipate skilled therapy needs at discharge .    PLAN  Patient has been evaluated and presents with no skilled Physical Therapy needs at this time.  Patient discharged from Physical Therapy services.  Please re-order if a new functional limitation presents during this admission.    GOALS  Patient was able to achieve the following goals ...    Patient was able to transfer Safely and independently   Patient able to ambulate on level surfaces Safely and independently         HOME SITUATION  Type of Home: House   Home Layout: Two level  Stairs to Enter : 2  Railing: Yes  Stairs to Bedroom: 13  Railing: Yes    Lives With: Spouse  Drives: Yes  Patient Owned Equipment: None  Patient Regularly Uses: Glasses    Prior Level of Dukes: per pt reports, pt is typically I with amb without assistive device, works and run multiple businesses, driving, and has no hx of falls. Pt lives with spouse.    SUBJECTIVE  \"I walk fast.  I walk a lot.\"      OBJECTIVE  Precautions: None  Fall Risk: Standard fall risk    WEIGHT BEARING RESTRICTION  Weight Bearing Restriction: None                PAIN ASSESSMENT  Ratin  Location: denies pain at this time        COGNITION  Overall Cognitive Status:  WFL - within functional limits    RANGE OF MOTION AND STRENGTH ASSESSMENT  Upper extremity ROM and strength are within functional limits     Lower extremity ROM is within functional limits     Lower extremity strength is within functional limits       BALANCE  Static Sitting: Good  Dynamic Sitting: Good  Static Standing: Good  Dynamic Standing: Good    ADDITIONAL TESTS                                    ACTIVITY TOLERANCE                         O2 WALK       NEUROLOGICAL FINDINGS  Neurological Findings: None                     AM-PAC '6-Clicks' INPATIENT SHORT FORM - BASIC MOBILITY  How much difficulty does the patient currently have...  Patient Difficulty: Turning over in bed (including adjusting bedclothes, sheets and blankets)?: None   Patient Difficulty: Sitting down on and standing up from a chair with arms (e.g., wheelchair, bedside commode, etc.): None   Patient Difficulty: Moving from lying on back to sitting on the side of the bed?: None   How much help from another person does the patient currently need...   Help from Another: Moving to and from a bed to a chair (including a wheelchair)?: None   Help from Another: Need to walk in hospital room?: None   Help from Another: Climbing 3-5 steps with a railing?: None       AM-PAC Score:  Raw Score: 24   Approx Degree of Impairment: 0%   Standardized Score (AM-PAC Scale): 61.14   CMS Modifier (G-Code): CH    FUNCTIONAL ABILITY STATUS  Gait Assessment   Functional Mobility/Gait Assessment  Gait Assistance: Independent  Distance (ft): 150  Assistive Device: None  Pattern: Within Functional Limits  Stairs: Stairs  How Many Stairs: 4  Device: 1 Rail  Assist: Independent  Pattern: Ascend and Descend  Ascend and Descend : Reciprocal    Skilled Therapy Provided     Bed Mobility:  Rolling: indpe  Supine to sit: indep   Sit to supine: indep     Transfer Mobility:  Sit to stand: indep   Stand to sit: indep  Gait = pt amb x 150  feet without assistive device independently.    Therapist's comments:per RN pt ok to be seen. Pt received in bed, family at beside. Pt has no c/o at this time. Pt mobility as above. Pt was able to ascend/descend stairs (limited by IV line) with 1 handrail independently. Pt returns to room and back to supine. Pt educated on activity recommendations, questions answered, and RN aware of session.  Pt left with family in room.    Exercise/Education Provided:  Bed mobility  Functional activity tolerated  Gait training    Patient End of Session: In bed;Needs met;Call light within reach;RN aware of session/findings;All patient questions and concerns addressed;Family present    Patient Evaluation Complexity Level:  History Moderate - 1 or 2 personal factors and/or co-morbidities   Examination of body systems Low - addressing 1-2 elements   Clinical Presentation Low - Stable   Clinical Decision Making Low Complexity       PT Session Time: 15 minutes  Gait Trainin minutes

## 2024-05-25 NOTE — OCCUPATIONAL THERAPY NOTE
OT orders received, chart reviewed.  Pt was off the floor for US earlier this AM.  Attempted to see pt, per RN pt is occupied with echo at this time, will attempt to see pt at a later time as appropriate.

## 2024-05-25 NOTE — PROGRESS NOTES
Progress Note  John Mojica Patient Status:  Inpatient    1940 MRN [id number]   Location [facility] 2NE-A Attending Jamie Hopson MD   Hosp Day # 1 PCP No primary care provider on file.     Subjective:  Pt denies chest pain/pressure or SOB. States he felt occasional chest pressure prior to going to ED. Denies n/v/diaphoresis. Denies worsening with activity; states it was worse with stress/anxiety. Denies any discomfort currently.     Objective:  /73 (BP Location: Left arm)   Pulse 71   Temp 97.5 °F (36.4 °C) (Oral)   Resp 22   Ht 6' (1.829 m)   Wt 179 lb 7.3 oz (81.4 kg)   SpO2 95%   BMI 24.34 kg/m²     Telemetry:     Intake/Output:  No intake or output data in the 24 hours ending 24 0916    Last 3 Weights   24 179 lb 7.3 oz (81.4 kg)   24 1220 175 lb (79.4 kg)       Labs:  Recent Labs   Lab 24  1222 24  0607   * 112* 104*   BUN 23 20 20   CREATSERUM 0.94 0.88 0.74   EGFRCR 81 86 90   CA 9.4 8.6 8.4*    139 140   K 4.0 3.8 3.6    108 109   CO2 26.0 26.0 23.0     Recent Labs   Lab 24  0607   RBC 4.63 4.35 4.32   HGB 15.3 14.3 14.2   HCT 44.3 41.6 40.4   MCV 95.7 95.6 93.5   MCH 33.0 32.9 32.9   MCHC 34.5 34.4 35.1   RDW 13.2 12.9 12.9   NEPRELIM 4.04  --  3.62   WBC 5.7 8.5 5.5   .0 188.0 167.0         Recent Labs   Lab 24  1222 24  0202   TROPHS 19 789* 608*       Diagnostics:  XR CHEST AP PORTABLE  (CPT=71045)    Result Date: 2024  CONCLUSION:  Exam is within normal limits.   LOCATION:  [facility]      Dictated by (CST): Gabriella Perez DO on 2024 at 12:52 PM     Finalized by (CST): Gabriella Perez DO on 2024 at 12:53 PM       Review of Systems   Constitutional: Negative.   Cardiovascular:  Negative for chest pain, dyspnea on exertion, leg swelling, orthopnea, palpitations and paroxysmal nocturnal dyspnea.   Respiratory: Negative.          Physical Exam:    Gen: alert, oriented x 3, NAD  Heent: pupils equal, reactive. Mucous membranes moist.   Neck: no jvd  Cardiac: regular rate and rhythm, normal S1,S2, no murmur, clicks, rub or gallop  Lungs: CTA  Abd: soft, NT/ND +bs  Ext: no edema  Skin: Warm, dry  Neuro: No focal deficits      Medications:     potassium chloride  40 mEq Oral Q4H    aspirin  325 mg Oral Daily    metoprolol tartrate  25 mg Oral 2x Daily(Beta Blocker)    losartan  12.5 mg Oral Daily    spironolactone  12.5 mg Oral Daily    PHENobarbital  194.4 mg Oral Nightly      continuous dose heparin         Assessment:  NSTEMI, New LV Dysfunction   Initial trop neg > 798 > 608  ECG w/ST, Freq PVCs; w/o acute ischemic changes  Stress echo in ED canceled d/t decreased LVEF on resting echo - reported EF 25-30%  Full TTE pending this am  Denies chest pain/pressure  On IV Heparin gtt - plan for LRHC on Tues  On asa, bb, ARB  Hypertensive Urgency  /97 on ED arrival; now controlled  Started on lopressor 25mg po BID, losartan 12.5mg po daily, aldactone 12.5mg po daily  Historically on amlodipine - discontinued w/decreased LVEF  Renal US pending this am   Hyperlipidemia - ,   Hx Seizures - on phenobarbital; last seizure 1989    Plan:  Continue asa, metoprolol, losartan, spironolactone  Denies chest pain currently, last troponin downtrending. Continue IV heparin gtt - plan for L/RHC on Tuesday.   Repeat troponin tomorrow AM or sooner if CP occurs.  Start atorvastatin 40mg po daily   Await echocardiogram and renal artery ultrasound results    Plan of care discussed with patient, RN.    Fernanda Rosario, APRN  5/25/2024  9:16 AM  [phone] [facility]  [phone] [facility]         Chart reveiwed and decision making performed in entirety with discussion with staff. Agree with above note and assessment with the following additions made below.     General: No acute distress.   HEENT- NCAT,   CVS- normal S1, S2  Lungs- clear  bilaterally.   Abdomen- soft, non-tender.  Extremities- Equal pulses, no edema.   Echo pending.  Consider amiodarone for frequent ectopy  if LVEF is low, possibly due to ectopy as a contributing factor.     Ashlie Morgan MD  [facility] [place]  Cardiac Electrophysiolgy

## 2024-05-25 NOTE — PLAN OF CARE
Pt is received around 0730. A&O x 4, denies a chest pain and dizziness. Lungs are clear, room air. NSR on tele monitor. Last BM 4/24, bowel sounds active x 4, abdomen soft and non tender. All needs are met, call light within the reach. Hep gtt is running per order. Pt updated with plan of care.        Problem: Patient/Family Goals  Goal: Patient/Family Long Term Goal  Description: Patient's Long Term Goal: Stay out of the hospital    Interventions:  - Take all meds as prescribed  -Follow up with PCP  -Follow up with CARDS  - See additional Care Plan goals for specific interventions  Outcome: Progressing  Goal: Patient/Family Short Term Goal  Description: Patient's Short Term Goal: Feel better    Interventions:   - ECHO  -US kidney  -HEP gtt  -Hourly rounds  -Assessment  - See additional Care Plan goals for specific interventions  Outcome: Progressing

## 2024-05-26 PROBLEM — I16.0 HYPERTENSIVE URGENCY: Status: ACTIVE | Noted: 2024-05-26

## 2024-05-26 LAB
ANION GAP SERPL CALC-SCNC: 7 MMOL/L (ref 0–18)
APTT PPP: 71.9 SECONDS (ref 23–36)
BUN BLD-MCNC: 19 MG/DL (ref 9–23)
CALCIUM BLD-MCNC: 8.6 MG/DL (ref 8.5–10.1)
CHLORIDE SERPL-SCNC: 111 MMOL/L (ref 98–112)
CO2 SERPL-SCNC: 21 MMOL/L (ref 21–32)
CREAT BLD-MCNC: 0.72 MG/DL
EGFRCR SERPLBLD CKD-EPI 2021: 91 ML/MIN/1.73M2 (ref 60–?)
GLUCOSE BLD-MCNC: 103 MG/DL (ref 70–99)
OSMOLALITY SERPL CALC.SUM OF ELEC: 291 MOSM/KG (ref 275–295)
PLATELET # BLD AUTO: 179 10(3)UL (ref 150–450)
POTASSIUM SERPL-SCNC: 4 MMOL/L (ref 3.5–5.1)
SODIUM SERPL-SCNC: 139 MMOL/L (ref 136–145)
TROPONIN I SERPL HS-MCNC: 108 NG/L

## 2024-05-26 PROCEDURE — 99232 SBSQ HOSP IP/OBS MODERATE 35: CPT | Performed by: HOSPITALIST

## 2024-05-26 NOTE — PROGRESS NOTES
[facility]   [facility]     John Mojica Patient Status:  Inpatient    1940 MRN [id number]   [facility] 2NE-A Attending Jamie Hopson MD   Hosp Day # 2 PCP No primary care provider on file.     Subjective:   No issues     Objective:    Review of Systems:   A comprehensive review of systems was completed; pertinent positive and negatives stated in subjective.  Vital signs:  Temp:  [97.4 °F (36.3 °C)-98.4 °F (36.9 °C)] 97.4 °F (36.3 °C)  Pulse:  [67-74] 73  Resp:  [18-20] 18  BP: (108-127)/(66-74) 127/74  SpO2:  [92 %-96 %] 96 %  Physical Exam:    General: No acute distress   Respiratory: no wheezes, no rhonchi  Cardiovascular: S1, S2, RRR  Abdomen: Soft, NT/ND, +BS  Extremities: no edema    Diagnostic Data:    Labs:  Recent Labs   Lab 24  0607 24  0748   WBC 5.7 8.5 5.5  --    HGB 15.3 14.3 14.2  --    MCV 95.7 95.6 93.5  --    .0 188.0 167.0 179.0     Recent Labs   Lab 242 24  0607 24  1537 24  0749   * 112* 104*  --  103*   BUN 23 20 20  --  19   CREATSERUM 0.94 0.88 0.74  --  0.72   CA 9.4 8.6 8.4*  --  8.6   ALB 4.0  --   --   --   --     139 140  --  139   K 4.0 3.8 3.6 4.4 4.0    108 109  --  111   CO2 26.0 26.0 23.0  --  21.0   ALKPHO 64  --   --   --   --    AST 18  --   --   --   --    ALT 41  --   --   --   --    BILT 0.4  --   --   --   --    TP 7.9  --   --   --   --      Estimated Creatinine Clearance: 86.8 mL/min (based on SCr of 0.72 mg/dL).  No results for input(s): \"PTP\", \"INR\" in the last 168 hours.     Microbiology  No results found for this visit on 24.  Imaging: Reviewed in Epic.  Medications:    atorvastatin  40 mg Oral Nightly    losartan  12.5 mg Oral Daily    aspirin  325 mg Oral Daily    metoprolol tartrate  25 mg Oral 2x Daily(Beta Blocker)    spironolactone  12.5 mg Oral Daily    PHENobarbital  194.4 mg  Oral Nightly       Assessment & Plan:    # Chest pain- concern for UA/NSTEMI as troponin 19 on admission and now 789/608  -Cardiology consult appreciated  -Patient was sent for stress echo but was aborted due to low EF on resting images  -Aspirin, BB, losartan, spironolactone   -Heparin gtt started with trop elevation- trop trending down now   -angiogram- timing per Cardiology   -f/u ECHO results      #Hypertensive urgency  -improved   -renal US doppler unimpressive      #Seizure disorder- phenobarbital   #Anxiety- contributing to his symptoms- will consider medical tx if worsens         Jamie Hopson MD  Supplementary Documentation:   Quality:  DVT Mechanical Prophylaxis:   SCDs,    DVT Pharmacologic Prophylaxis   Medication    heparin (Porcine) 90415 units/250mL infusion ACS/AFIB CONTINUOUS    DVT Pharmacologic prophylaxis: Aspirin 325 mg           Code Status: Not on file  Michael: No urinary catheter in place  Michael Duration (in days):   Central line:    MI: 5/28/2024  At this point Mr. Mojica is expected to be discharge to: home     The 21st Century Cures Act makes medical notes like these available to patients in the interest of transparency. Please be advised this is a medical document. Medical documents are intended to carry relevant information, facts as evident, and the clinical opinion of the practitioner. The medical note is intended as peer to peer communication and may appear blunt or direct. It is written in medical language and may contain abbreviations or verbiage that are unfamiliar.

## 2024-05-26 NOTE — PLAN OF CARE
Patient alert and oriented; VSS; cardiac monitor showing SR; lung sounds clear, on room air, no cough noted; no edema; continent of bowel and bladder with LBM today, per patient it was soft/loose because he had stewed prunes today; heparin infusing as ordered, ptt was therapeutic today with next ptt tomorrow; patient ambulates independently with a steady gait, patient declines bed alarm; patient informed of NPO status Tuesday morning in anticipation of going to the cath lab.     Problem: Patient/Family Goals  Goal: Patient/Family Long Term Goal  Description: Patient's Long Term Goal: Stay out of the hospital    Interventions:  - Take all meds as prescribed  -Follow up with PCP  -Follow up with CARDS  - See additional Care Plan goals for specific interventions  Outcome: Progressing  Goal: Patient/Family Short Term Goal  Description: Patient's Short Term Goal: Feel better    Interventions:   - ECHO  -US kidney  -HEP gtt  -Hourly rounds  -Assessment  - See additional Care Plan goals for specific interventions  Outcome: Progressing     Problem: CARDIOVASCULAR - ADULT  Goal: Maintains optimal cardiac output and hemodynamic stability  Description: INTERVENTIONS:  - Monitor vital signs, rhythm, and trends  - Monitor for bleeding, hypotension and signs of decreased cardiac output  - Evaluate effectiveness of vasoactive medications to optimize hemodynamic stability  - Monitor arterial and/or venous puncture sites for bleeding and/or hematoma  - Assess quality of pulses, skin color and temperature  - Assess for signs of decreased coronary artery perfusion - ex. Angina  - Evaluate fluid balance, assess for edema, trend weights  Outcome: Progressing  Goal: Absence of cardiac arrhythmias or at baseline  Description: INTERVENTIONS:  - Continuous cardiac monitoring, monitor vital signs, obtain 12 lead EKG if indicated  - Evaluate effectiveness of antiarrhythmic and heart rate control medications as ordered  - Initiate emergency  measures for life threatening arrhythmias  - Monitor electrolytes and administer replacement therapy as ordered  Outcome: Progressing

## 2024-05-26 NOTE — PLAN OF CARE
Assumed care of patient @ 1930 patient resting in bed, AOX4, reports no pain. Lung sounds clear bilaterally, O2 saturation adequate on room air. No complaints of shortness of breath or chest pain at this time. Sinus rhythm on tele, S1-S2 present, no adventitious sounds noted. Bowel sounds present and active in all quadrants, last bowel movement 5/24 per pt. Patient voiding without issue. Pt ambulating with steady gait. No skin issues noted.     Plan of Care: Heparin gtt per protocol. Trend troponin. Right/left heart cath possible Tuesday per cardiology.     Discussed plan of care with patient, verbalized understanding. Call light within reach.     Problem: Patient/Family Goals  Goal: Patient/Family Long Term Goal  Description: Patient's Long Term Goal: Stay out of the hospital    Interventions:  - Take all meds as prescribed  -Follow up with PCP  -Follow up with CARDS  - See additional Care Plan goals for specific interventions  Outcome: Progressing  Goal: Patient/Family Short Term Goal  Description: Patient's Short Term Goal: Feel better    Interventions:   - ECHO  -US kidney  -HEP gtt  -Hourly rounds  -Assessment  - See additional Care Plan goals for specific interventions  Outcome: Progressing     Problem: CARDIOVASCULAR - ADULT  Goal: Maintains optimal cardiac output and hemodynamic stability  Description: INTERVENTIONS:  - Monitor vital signs, rhythm, and trends  - Monitor for bleeding, hypotension and signs of decreased cardiac output  - Evaluate effectiveness of vasoactive medications to optimize hemodynamic stability  - Monitor arterial and/or venous puncture sites for bleeding and/or hematoma  - Assess quality of pulses, skin color and temperature  - Assess for signs of decreased coronary artery perfusion - ex. Angina  - Evaluate fluid balance, assess for edema, trend weights  Outcome: Progressing  Goal: Absence of cardiac arrhythmias or at baseline  Description: INTERVENTIONS:  - Continuous cardiac  monitoring, monitor vital signs, obtain 12 lead EKG if indicated  - Evaluate effectiveness of antiarrhythmic and heart rate control medications as ordered  - Initiate emergency measures for life threatening arrhythmias  - Monitor electrolytes and administer replacement therapy as ordered  Outcome: Progressing

## 2024-05-26 NOTE — PROGRESS NOTES
Progress Note  John Mojica Patient Status:  Inpatient    1940 MRN [id number]   Location [facility] 2NE-A Attending Jamie Hopson MD   Hosp Day # 2 PCP No primary care provider on file.     Subjective:  Pt denies chest pain/pressure or SOB. States he felt occasional chest pressure prior to going to ED. Denies n/v/diaphoresis. Denies worsening with activity; states it was worse with stress/anxiety. Denies any discomfort currently.     Objective:  /74 (BP Location: Right arm)   Pulse 69   Temp 98.2 °F (36.8 °C) (Oral)   Resp 18   Ht 72\"   Wt 179 lb 7.3 oz (81.4 kg)   SpO2 96%   BMI 24.34 kg/m²     Telemetry:     Intake/Output:    Intake/Output Summary (Last 24 hours) at 2024 1051  Last data filed at 2024 0900  Gross per 24 hour   Intake 660 ml   Output --   Net 660 ml       Last 3 Weights   24 179 lb 7.3 oz (81.4 kg)   24 1220 175 lb (79.4 kg)       Labs:  Recent Labs   Lab 24  0607 24  1537 24  0749   * 104*  --  103*   BUN 20 20  --  19   CREATSERUM 0.88 0.74  --  0.72   EGFRCR 86 90  --  91   CA 8.6 8.4*  --  8.6    140  --  139   K 3.8 3.6 4.4 4.0    109  --  111   CO2 26.0 23.0  --  21.0     Recent Labs   Lab 24  0607 24  0748   RBC 4.63 4.35 4.32  --    HGB 15.3 14.3 14.2  --    HCT 44.3 41.6 40.4  --    MCV 95.7 95.6 93.5  --    MCH 33.0 32.9 32.9  --    MCHC 34.5 34.4 35.1  --    RDW 13.2 12.9 12.9  --    NEPRELIM 4.04  --  3.62  --    WBC 5.7 8.5 5.5  --    .0 188.0 167.0 179.0         Recent Labs   Lab 24  0749   TROPHS 789* 608* 108*       Diagnostics:  No results found.   Review of Systems   Constitutional: Negative.   Cardiovascular:  Negative for chest pain, dyspnea on exertion, leg swelling, orthopnea, palpitations and paroxysmal nocturnal dyspnea.   Respiratory: Negative.         Physical Exam:    Gen: alert,  oriented x 3, NAD  Heent: pupils equal, reactive. Mucous membranes moist.   Neck: no jvd  Cardiac: regular rate and rhythm, normal S1,S2, no murmur, clicks, rub or gallop  Lungs: CTA  Abd: soft, NT/ND +bs  Ext: no edema  Skin: Warm, dry  Neuro: No focal deficits      Medications:     atorvastatin  40 mg Oral Nightly    losartan  12.5 mg Oral Daily    aspirin  325 mg Oral Daily    metoprolol tartrate  25 mg Oral 2x Daily(Beta Blocker)    spironolactone  12.5 mg Oral Daily    PHENobarbital  194.4 mg Oral Nightly      continuous dose heparin 1,000 Units/hr (05/26/24 0132)       Assessment:  NSTEMI, New LV Dysfunction   Initial trop neg > 798 > 608  ECG w/ST, Freq PVCs; w/o acute ischemic changes  Stress echo in ED canceled d/t decreased LVEF on resting echo - reported EF 25-30%  Full TTE pending this am  Denies chest pain/pressure  On IV Heparin gtt - plan for LRHC on Tues  On asa, bb, ARB  Hypertensive Urgency  /97 on ED arrival; now controlled  Started on lopressor 25mg po BID, losartan 12.5mg po daily, aldactone 12.5mg po daily  Historically on amlodipine - discontinued w/decreased LVEF  Renal US pending this am   Hyperlipidemia - ,   Hx Seizures - on phenobarbital; last seizure 1989    Plan:  Continue asa, metoprolol, losartan, spironolactone  Denies chest pain currently, last troponin downtrending. Continue IV heparin gtt - plan for L/RHC on Tuesday.   Repeat troponin tomorrow AM or sooner if CP occurs.  Start atorvastatin 40mg po daily   Await echocardiogram and renal artery ultrasound results  Consider amiodarone for frequent ectopy  if LVEF is low, possibly due to ectopy as a contributing factor.     Ashlie Morgan MD  [facility]  Cardiac Electrophysiolgy

## 2024-05-27 LAB
APTT PPP: 57 SECONDS (ref 23–36)
PLATELET # BLD AUTO: 170 10(3)UL (ref 150–450)

## 2024-05-27 PROCEDURE — 99232 SBSQ HOSP IP/OBS MODERATE 35: CPT | Performed by: HOSPITALIST

## 2024-05-27 RX ORDER — SODIUM CHLORIDE 9 MG/ML
INJECTION, SOLUTION INTRAVENOUS
Status: DISCONTINUED | OUTPATIENT
Start: 2024-05-28 | End: 2024-05-28 | Stop reason: HOSPADM

## 2024-05-27 NOTE — PLAN OF CARE
Assumed care for patient at 0730  AOx4.  Independent.  Up ad heike.  NSR on cardiac monitor.  Adequate saturation on RA.  Lung sounds clear.  Denies sob, chest discomfort, n/v.  Denies pain. Voiding without difficulty.    Plan is for R/L heart cath tomorrow.  Heparin gtt infusing.        Problem: Patient/Family Goals  Goal: Patient/Family Long Term Goal  Description: Patient's Long Term Goal: Stay out of the hospital    Interventions:  - Take all meds as prescribed  -Follow up with PCP  -Follow up with CARDS  - See additional Care Plan goals for specific interventions  Outcome: Progressing  Goal: Patient/Family Short Term Goal  Description: Patient's Short Term Goal: Feel better    Interventions:   - ECHO  -US kidney  -HEP gtt  -Hourly rounds  -Assessment  - See additional Care Plan goals for specific interventions  Outcome: Progressing     Problem: CARDIOVASCULAR - ADULT  Goal: Maintains optimal cardiac output and hemodynamic stability  Description: INTERVENTIONS:  - Monitor vital signs, rhythm, and trends  - Monitor for bleeding, hypotension and signs of decreased cardiac output  - Evaluate effectiveness of vasoactive medications to optimize hemodynamic stability  - Monitor arterial and/or venous puncture sites for bleeding and/or hematoma  - Assess quality of pulses, skin color and temperature  - Assess for signs of decreased coronary artery perfusion - ex. Angina  - Evaluate fluid balance, assess for edema, trend weights  Outcome: Progressing  Goal: Absence of cardiac arrhythmias or at baseline  Description: INTERVENTIONS:  - Continuous cardiac monitoring, monitor vital signs, obtain 12 lead EKG if indicated  - Evaluate effectiveness of antiarrhythmic and heart rate control medications as ordered  - Initiate emergency measures for life threatening arrhythmias  - Monitor electrolytes and administer replacement therapy as ordered  Outcome: Progressing

## 2024-05-27 NOTE — PROGRESS NOTES
05/27/24 0156 05/27/24 0157 05/27/24 0158   Oxygen Therapy   SpO2 (!) 87 % (!) 85 % (!) 86 %   O2 Device None (Room air) None (Room air) None (Room air)   Pulse Oximetry Type Continuous Continuous Continuous      05/27/24 0159 05/27/24 0200   Oxygen Therapy   SpO2 (!) 86 % (!) 86 %   O2 Device None (Room air) None (Room air)   Pulse Oximetry Type Continuous Continuous     Desatting with sleep, 2L o2nc applied

## 2024-05-27 NOTE — PROGRESS NOTES
[facility]   part of [facility] Progress Note     John Mojica Patient Status:  Inpatient    1940 MRN [id number]   [facility] 2NE-A Attending Jamie Hopson MD   Hosp Day # 3 PCP No primary care provider on file.     Subjective:   No issues     Objective:    Review of Systems:   A comprehensive review of systems was completed; pertinent positive and negatives stated in subjective.  Vital signs:  Temp:  [97.5 °F (36.4 °C)-98.6 °F (37 °C)] 98.6 °F (37 °C)  Pulse:  [61-99] 75  Resp:  [18-19] 18  BP: (123-144)/(70-82) 142/82  SpO2:  [85 %-97 %] 96 %  Physical Exam:    General: No acute distress   Respiratory: no wheezes, no rhonchi  Cardiovascular: S1, S2, RRR  Abdomen: Soft, NT/ND, +BS  Extremities: no edema    Diagnostic Data:    Labs:  Recent Labs   Lab 24  0607 24  0748 24  0626   WBC 5.7 8.5 5.5  --   --    HGB 15.3 14.3 14.2  --   --    MCV 95.7 95.6 93.5  --   --    .0 188.0 167.0 179.0 170.0     Recent Labs   Lab 24  1222 24  0607 24  1537 24  0749   * 112* 104*  --  103*   BUN 23 20 20  --  19   CREATSERUM 0.94 0.88 0.74  --  0.72   CA 9.4 8.6 8.4*  --  8.6   ALB 4.0  --   --   --   --     139 140  --  139   K 4.0 3.8 3.6 4.4 4.0    108 109  --  111   CO2 26.0 26.0 23.0  --  21.0   ALKPHO 64  --   --   --   --    AST 18  --   --   --   --    ALT 41  --   --   --   --    BILT 0.4  --   --   --   --    TP 7.9  --   --   --   --      Estimated Creatinine Clearance: 86.8 mL/min (based on SCr of 0.72 mg/dL).  No results for input(s): \"PTP\", \"INR\" in the last 168 hours.     Microbiology  No results found for this visit on 24.  Imaging: Reviewed in Epic.  Medications:    atorvastatin  40 mg Oral Nightly    losartan  12.5 mg Oral Daily    aspirin  325 mg Oral Daily    metoprolol tartrate  25 mg Oral 2x Daily(Beta Blocker)    spironolactone  12.5 mg Oral  Daily    PHENobarbital  194.4 mg Oral Nightly       Assessment & Plan:    # Chest pain- concern for UA/NSTEMI as troponin 19 on admission and now 789/608  -Cardiology consult appreciated  -Patient was sent for stress echo but was aborted due to low EF on resting images  -Aspirin, BB, losartan, spironolactone   -Heparin gtt started with trop elevation- trop trending down now   -angiogram- timing per Cardiology   -f/u ECHO results   -Ectopy and considering amio per cardiology     #Hypertensive urgency  -improved   -renal US doppler unimpressive      #Seizure disorder- phenobarbital     #Anxiety- contributing to his symptoms- controlled at this time     Extensive d/w family and patient today   Cath tomorrow     Jamie Hopson MD  Supplementary Documentation:   Quality:  DVT Mechanical Prophylaxis:   SCDs,    DVT Pharmacologic Prophylaxis   Medication    heparin (Porcine) 94620 units/250mL infusion ACS/AFIB CONTINUOUS    DVT Pharmacologic prophylaxis: Aspirin 325 mg           Code Status: Not on file  Michael: No urinary catheter in place  Michael Duration (in days):   Central line:    MI: 5/29/2024  At this point Mr. Mojica is expected to be discharge to: home     The 21st Century Cures Act makes medical notes like these available to patients in the interest of transparency. Please be advised this is a medical document. Medical documents are intended to carry relevant information, facts as evident, and the clinical opinion of the practitioner. The medical note is intended as peer to peer communication and may appear blunt or direct. It is written in medical language and may contain abbreviations or verbiage that are unfamiliar.

## 2024-05-27 NOTE — PLAN OF CARE
Assumed  care of patient at 1930  A&Ox4. Remains on Room Air, denies sob. NSR on tele monitor, heparin gtt infusing per orders. Pt denies cardiac symptoms, pt states has had no chest pressure/discomfort since coming into ER. Continent, voiding without issue. Denies pain. Ambulating with minimal assist. Frequent rounding in place. Pt updated and agrees with plan of care.       Problem: Patient/Family Goals  Goal: Patient/Family Long Term Goal  Description: Patient's Long Term Goal: Stay out of the hospital    Interventions:  - Take all meds as prescribed  -Follow up with PCP  -Follow up with CARDS  - See additional Care Plan goals for specific interventions  Outcome: Progressing  Goal: Patient/Family Short Term Goal  Description: Patient's Short Term Goal: Feel better    Interventions:   - ECHO  -US kidney  -HEP gtt  -Hourly rounds  -Assessment  - See additional Care Plan goals for specific interventions  Outcome: Progressing

## 2024-05-27 NOTE — PROGRESS NOTES
Progress Note  John Mojica Patient Status:  Inpatient    1940 MRN [id number]   Location [facility] 2NE-A Attending Jamie Hopson MD   Hosp Day # 3 PCP No primary care provider on file.     Subjective:  No complaints. No further chest pressure.  No acute overnight events.      Objective:  /70 (BP Location: Left arm)   Pulse 68   Temp 98 °F (36.7 °C) (Oral)   Resp 18   Ht 6' (1.829 m)   Wt 179 lb 7.3 oz (81.4 kg)   SpO2 97%   BMI 24.34 kg/m²          Intake/Output:    Intake/Output Summary (Last 24 hours) at 2024 0726  Last data filed at 2024 2113  Gross per 24 hour   Intake 900 ml   Output 511 ml   Net 389 ml       Last 3 Weights   24 179 lb 7.3 oz (81.4 kg)   24 1220 175 lb (79.4 kg)       Labs:  Recent Labs   Lab 24  2234 24  0607 24  1537 24  0749   * 104*  --  103*   BUN 20 20  --  19   CREATSERUM 0.88 0.74  --  0.72   EGFRCR 86 90  --  91   CA 8.6 8.4*  --  8.6    140  --  139   K 3.8 3.6 4.4 4.0    109  --  111   CO2 26.0 23.0  --  21.0     Recent Labs   Lab 24  1222 24  2234 24  0607 24  0748 24  0626   RBC 4.63 4.35 4.32  --   --    HGB 15.3 14.3 14.2  --   --    HCT 44.3 41.6 40.4  --   --    MCV 95.7 95.6 93.5  --   --    MCH 33.0 32.9 32.9  --   --    MCHC 34.5 34.4 35.1  --   --    RDW 13.2 12.9 12.9  --   --    NEPRELIM 4.04  --  3.62  --   --    WBC 5.7 8.5 5.5  --   --    .0 188.0 167.0 179.0 170.0         Recent Labs   Lab 24  0202 24  0749   TROPHS 789* 608* 108*       Diagnostics:  No results found.   Review of Systems   Constitutional: Negative.   Cardiovascular:  Negative for chest pain, dyspnea on exertion, leg swelling, orthopnea, palpitations and paroxysmal nocturnal dyspnea.   Respiratory: Negative.         Physical Exam:    Gen: alert, oriented x 3, NAD  Heent: pupils equal, reactive. Mucous membranes moist.   Neck: no  jvd  Cardiac: regular rate and rhythm, normal S1,S2, no murmur, clicks, rub or gallop  Lungs: CTA  Abd: soft, NT/ND +bs  Ext: no edema  Skin: Warm, dry  Neuro: No focal deficits      Medications:     atorvastatin  40 mg Oral Nightly    losartan  12.5 mg Oral Daily    aspirin  325 mg Oral Daily    metoprolol tartrate  25 mg Oral 2x Daily(Beta Blocker)    spironolactone  12.5 mg Oral Daily    PHENobarbital  194.4 mg Oral Nightly      continuous dose heparin 1,000 Units/hr (05/26/24 2111)       Assessment:  NSTEMI, New LV Dysfunction   Initial trop neg > 798 > 608  ECG w/ST, Freq PVCs; w/o acute ischemic changes  Stress echo in ED canceled d/t decreased LVEF on resting echo - reported EF 25-30%  On IV Heparin gtt - plan for LRHC on Tues  On asa, bb, ARB  Hypertensive Urgency  /97 on ED arrival; now controlled  lopressor 25mg po BID, losartan 12.5mg po daily, aldactone 12.5mg po daily  Historically on amlodipine - discontinued w/decreased LVEF  Hyperlipidemia - ,   Hx Seizures - on phenobarbital; last seizure 1989    Plan:  Continue asa, metoprolol, losartan, spironolactone  Denies chest pain, troponin downtrending.   Continue IV heparin gtt - plan for L/RHC on Tuesday for ischemic eval.   atorvastatin 40mg po daily   echocardiogram pending   renal artery ultrasound neg for sig main renal stenosis   EP recs: Consider amiodarone for frequent ectopy if LVEF is low, possibly due to ectopy as a contributing factor.         Cisco Lu DO  Cardiologist  [facility]  5/27/2024 7:27 AM      Note to the patient: The 21st Century Cures Act makes medical notes like these available to patients in the interest of transparency. However, be advised that this is a medical document. It is intended as peer to peer communication. It is written in medical language and may contain abbreviations or verbiage that are unfamiliar. It may appear blunt or direct. Medical documents are intended to carry  relevant information, facts as evident, and clinical opinion of the practitioner.     Disclaimer: Components of this note were documented using voice recognition system and are subject to errors not corrected at proofreading. Contact the author of this note for any clarifications.

## 2024-05-28 ENCOUNTER — APPOINTMENT (OUTPATIENT)
Dept: INTERVENTIONAL RADIOLOGY/VASCULAR | Facility: HOSPITAL | Age: 84
DRG: 322 | End: 2024-05-28
Attending: PHYSICIAN ASSISTANT

## 2024-05-28 LAB
ANION GAP SERPL CALC-SCNC: 5 MMOL/L (ref 0–18)
APTT PPP: 55.1 SECONDS (ref 23–36)
BUN BLD-MCNC: 22 MG/DL (ref 9–23)
CALCIUM BLD-MCNC: 8.7 MG/DL (ref 8.5–10.1)
CHLORIDE SERPL-SCNC: 111 MMOL/L (ref 98–112)
CO2 SERPL-SCNC: 22 MMOL/L (ref 21–32)
CREAT BLD-MCNC: 0.87 MG/DL
EGFRCR SERPLBLD CKD-EPI 2021: 86 ML/MIN/1.73M2 (ref 60–?)
ERYTHROCYTE [DISTWIDTH] IN BLOOD BY AUTOMATED COUNT: 12.7 %
GLUCOSE BLD-MCNC: 95 MG/DL (ref 70–99)
HCT VFR BLD AUTO: 43.2 %
HGB BLD-MCNC: 14.6 G/DL
MCH RBC QN AUTO: 32.5 PG (ref 26–34)
MCHC RBC AUTO-ENTMCNC: 33.8 G/DL (ref 31–37)
MCV RBC AUTO: 96.2 FL
OSMOLALITY SERPL CALC.SUM OF ELEC: 289 MOSM/KG (ref 275–295)
PLATELET # BLD AUTO: 179 10(3)UL (ref 150–450)
POTASSIUM SERPL-SCNC: 4 MMOL/L (ref 3.5–5.1)
RBC # BLD AUTO: 4.49 X10(6)UL
SODIUM SERPL-SCNC: 138 MMOL/L (ref 136–145)
WBC # BLD AUTO: 5 X10(3) UL (ref 4–11)

## 2024-05-28 PROCEDURE — B240ZZ3 ULTRASONOGRAPHY OF SINGLE CORONARY ARTERY, INTRAVASCULAR: ICD-10-PCS | Performed by: INTERNAL MEDICINE

## 2024-05-28 PROCEDURE — B2111ZZ FLUOROSCOPY OF MULTIPLE CORONARY ARTERIES USING LOW OSMOLAR CONTRAST: ICD-10-PCS | Performed by: INTERNAL MEDICINE

## 2024-05-28 PROCEDURE — 99232 SBSQ HOSP IP/OBS MODERATE 35: CPT | Performed by: HOSPITALIST

## 2024-05-28 PROCEDURE — 027034Z DILATION OF CORONARY ARTERY, ONE ARTERY WITH DRUG-ELUTING INTRALUMINAL DEVICE, PERCUTANEOUS APPROACH: ICD-10-PCS | Performed by: INTERNAL MEDICINE

## 2024-05-28 PROCEDURE — B2151ZZ FLUOROSCOPY OF LEFT HEART USING LOW OSMOLAR CONTRAST: ICD-10-PCS | Performed by: INTERNAL MEDICINE

## 2024-05-28 PROCEDURE — 4A023N7 MEASUREMENT OF CARDIAC SAMPLING AND PRESSURE, LEFT HEART, PERCUTANEOUS APPROACH: ICD-10-PCS | Performed by: INTERNAL MEDICINE

## 2024-05-28 RX ORDER — LIDOCAINE HYDROCHLORIDE 10 MG/ML
INJECTION, SOLUTION EPIDURAL; INFILTRATION; INTRACAUDAL; PERINEURAL
Status: COMPLETED
Start: 2024-05-28 | End: 2024-05-28

## 2024-05-28 RX ORDER — ASPIRIN 81 MG/1
81 TABLET ORAL DAILY
Status: DISCONTINUED | OUTPATIENT
Start: 2024-05-29 | End: 2024-05-29

## 2024-05-28 RX ORDER — SODIUM CHLORIDE 9 MG/ML
INJECTION, SOLUTION INTRAVENOUS CONTINUOUS
Status: ACTIVE | OUTPATIENT
Start: 2024-05-28 | End: 2024-05-28

## 2024-05-28 RX ORDER — PHENOBARBITAL 97.2 MG/1
97.2 TABLET ORAL NIGHTLY
Status: DISCONTINUED | OUTPATIENT
Start: 2024-05-28 | End: 2024-05-28 | Stop reason: CLARIF

## 2024-05-28 RX ORDER — VERAPAMIL HYDROCHLORIDE 2.5 MG/ML
INJECTION, SOLUTION INTRAVENOUS
Status: COMPLETED
Start: 2024-05-28 | End: 2024-05-28

## 2024-05-28 RX ORDER — HEPARIN SODIUM 5000 [USP'U]/ML
INJECTION, SOLUTION INTRAVENOUS; SUBCUTANEOUS
Status: COMPLETED
Start: 2024-05-28 | End: 2024-05-28

## 2024-05-28 RX ORDER — MIDAZOLAM HYDROCHLORIDE 1 MG/ML
INJECTION INTRAMUSCULAR; INTRAVENOUS
Status: COMPLETED
Start: 2024-05-28 | End: 2024-05-28

## 2024-05-28 RX ORDER — MIDAZOLAM HYDROCHLORIDE 1 MG/ML
INJECTION INTRAMUSCULAR; INTRAVENOUS
Status: DISCONTINUED
Start: 2024-05-28 | End: 2024-05-28 | Stop reason: WASHOUT

## 2024-05-28 NOTE — PLAN OF CARE
Assumed patient care at 1930. Patient is alert and oriented x 4. Maintaining O2 saturation NWL on room air. NSR on tele monitor. No complains of pain. Heparin gtt infusing, per ACS /afib protocol. Patient continent of bladder and bowel. Patient is aware of plan of care. Safety precautions in place, call light within reach. All needs met at this time.     POC:   R/L heart cath     Problem: Patient/Family Goals  Goal: Patient/Family Long Term Goal  Description: Patient's Long Term Goal: Stay out of the hospital    Interventions:  - Take all meds as prescribed  -Follow up with PCP  -Follow up with CARDS  - See additional Care Plan goals for specific interventions  Outcome: Progressing  Goal: Patient/Family Short Term Goal  Description: Patient's Short Term Goal: Feel better    Interventions:   - ECHO  -US kidney  -HEP gtt  -Hourly rounds  -Assessment  - See additional Care Plan goals for specific interventions  Outcome: Progressing     Problem: CARDIOVASCULAR - ADULT  Goal: Maintains optimal cardiac output and hemodynamic stability  Description: INTERVENTIONS:  - Monitor vital signs, rhythm, and trends  - Monitor for bleeding, hypotension and signs of decreased cardiac output  - Evaluate effectiveness of vasoactive medications to optimize hemodynamic stability  - Monitor arterial and/or venous puncture sites for bleeding and/or hematoma  - Assess quality of pulses, skin color and temperature  - Assess for signs of decreased coronary artery perfusion - ex. Angina  - Evaluate fluid balance, assess for edema, trend weights  Outcome: Progressing  Goal: Absence of cardiac arrhythmias or at baseline  Description: INTERVENTIONS:  - Continuous cardiac monitoring, monitor vital signs, obtain 12 lead EKG if indicated  - Evaluate effectiveness of antiarrhythmic and heart rate control medications as ordered  - Initiate emergency measures for life threatening arrhythmias  - Monitor electrolytes and administer replacement therapy as  ordered  Outcome: Progressing

## 2024-05-28 NOTE — DIETARY NOTE
Clinical Nutrition     Dietitian consult received per cardiac rehab standing order. Pt to be educated by cardiac rehab staff and encouraged to attend outpatient classes taught by NAOMY. NAOMY available PRN.    Danelle Hooper, NAOMY, LDN, [facility]  Clinical Dietitian  Spectra: [phone]

## 2024-05-28 NOTE — PROGRESS NOTES
[facility]   part of [facility] Hospitalist Progress Note     John Mojica Patient Status:  Inpatient    1940 MRN [id number]   [facility] 2NE-A Attending Jamie Hopson MD   Hosp Day # 4 PCP No primary care provider on file.     Subjective:   No issues     Objective:    Review of Systems:   A comprehensive review of systems was completed; pertinent positive and negatives stated in subjective.  Vital signs:  Temp:  [97.7 °F (36.5 °C)-98.4 °F (36.9 °C)] 98.1 °F (36.7 °C)  Pulse:  [61-83] 71  Resp:  [16-19] 16  BP: (116-141)/(67-90) 141/67  SpO2:  [94 %-96 %] 96 %  Physical Exam:    General: No acute distress   Respiratory: no wheezes, no rhonchi  Cardiovascular: S1, S2, RRR  Abdomen: Soft, NT/ND, +BS  Extremities: no edema    Diagnostic Data:    Labs:  Recent Labs   Lab 24  0607 24  0748 24  0626   WBC 5.7 8.5 5.5  --   --    HGB 15.3 14.3 14.2  --   --    MCV 95.7 95.6 93.5  --   --    .0 188.0 167.0 179.0 170.0     Recent Labs   Lab 242 24  0607 24  1537 24  0749   * 112* 104*  --  103*   BUN 23 20 20  --  19   CREATSERUM 0.94 0.88 0.74  --  0.72   CA 9.4 8.6 8.4*  --  8.6   ALB 4.0  --   --   --   --     139 140  --  139   K 4.0 3.8 3.6 4.4 4.0    108 109  --  111   CO2 26.0 26.0 23.0  --  21.0   ALKPHO 64  --   --   --   --    AST 18  --   --   --   --    ALT 41  --   --   --   --    BILT 0.4  --   --   --   --    TP 7.9  --   --   --   --      Estimated Creatinine Clearance: 86.8 mL/min (based on SCr of 0.72 mg/dL).  No results for input(s): \"PTP\", \"INR\" in the last 168 hours.     Microbiology  No results found for this visit on 24.  Imaging: Reviewed in Epic.  Medications:    sodium chloride   Intravenous On Call    atorvastatin  40 mg Oral Nightly    losartan  12.5 mg Oral Daily    aspirin  325 mg Oral Daily    metoprolol tartrate  25 mg Oral 2x  Daily(Beta Blocker)    spironolactone  12.5 mg Oral Daily    PHENobarbital  194.4 mg Oral Nightly       Assessment & Plan:    # Chest pain- concern for UA/NSTEMI as troponin 19 on admission and now 789/608  -Cardiology consult appreciated  -Patient was sent for stress echo but was aborted due to low EF on resting images  -Aspirin, BB, losartan, spironolactone   -Heparin gtt started with trop elevation- trop trending down now   -angiogram today with cardiology    -ECHO 35-40%   -Ectopy and considering amio per cardiology     #Hypertensive urgency  -improved   -renal US doppler unimpressive      #Seizure disorder- phenobarbital     #Anxiety- contributing to his symptoms- controlled at this time     F/u cardio input with EF 35% and post cath results     Jamie Hopson MD  Supplementary Documentation:   Quality:  DVT Mechanical Prophylaxis:   SCDs,    DVT Pharmacologic Prophylaxis   Medication    heparin (Porcine) 65335 units/250mL infusion ACS/AFIB CONTINUOUS    DVT Pharmacologic prophylaxis: Aspirin 325 mg           Code Status: Not on file  Michael: No urinary catheter in place  Michael Duration (in days):   Central line:    MI: 5/29/2024  At this point Mr. Mojica is expected to be discharge to: home     The 21st Century Cures Act makes medical notes like these available to patients in the interest of transparency. Please be advised this is a medical document. Medical documents are intended to carry relevant information, facts as evident, and the clinical opinion of the practitioner. The medical note is intended as peer to peer communication and may appear blunt or direct. It is written in medical language and may contain abbreviations or verbiage that are unfamiliar.

## 2024-05-28 NOTE — CARDIAC REHAB
Education completed with patient and wife.  Stent card not available at this time.  Will follow up with Cath Lab.  Offered Cardiac rehab.  Initial consult scheduled for 6/27/24 at 2:30pm.  Pt to be discharged in the am on 5/29/24.

## 2024-05-28 NOTE — PROCEDURES
[facility]   part of [facility]    Cardiac Catheterization Note    Primary Proceduralist: Rosalind Perez MD  Procedure Performed: LHC, LAD IVUS, LAD PCI  Date of Procedure: 5/28/2024   Indication: NSTEMI  Pre Operative Diagnosis: NSTEMI  Post Operative Diagnosis: CAD  Estimated blood loss: 10cc  Specimens: None    Consent obtained from the patient and documented in the paper chart, unless verbally obtained in an emergency setting.  The benefits and risk of the procedure, including but not limited to infection, bleeding, myocardial infarction, stroke, vascular injury, emergency surgery, renal failure requiring dialysis and death were discussed with the patient. These complications occur in approximately 1-2% of elective procedures, but the risk may be significantly elevated in the setting of acute coronary syndrome, electrical or hemodynamic instability, multivessel disease, reduced LVEF or . The patient consented to any additional procedures performed emergently in order to address a complication or prevent medical deterioration. Viable alternatives were explained to the patient, including continuing medical therapy, with the risks incurred along that course.      Procedure/Technique:    Lidocaine 1% was administered locally. Access of right radial artery obtained using Seldinger technique. Ultrasound was not used.     6 Fr Sheath was inserted. J-wire advanced into the aorta under fluoroscopy.    Left coronary system engaged using 6 Fr TIG. Selective angiogram performed.     Right coronary system engaged using 6 Fr TIG.  Selective angiogram performed. 6 pigtail Catheter advanced into the LV. Hemodynamics were obtained.    Coronary Angiogram Findings:  LM: Large caliber artery giving rise to LAD & LCX. No significant stenosis.  LAD: Large caliber artery giving rise to diagonal and septal branches. Diffuse moderate stenosis with focal 95% mid LAD stenosis.  LCX: Medium to large  caliber artery giving rise to OM branches. Mild diffuse disease.  RCA: Large caliber artery giving rise to acute marginal branches, and bifurcates into RPDA & RPL. Mild diffuse disease. Right dominant circulation.    Hemodynamics:  /4, LVEDP 10  AO 92/42, mean 62  No significant gradient upon Ao-LV pullback  LVEF 45-50%, mild apical hypokinesis    Intervention:  6 Fr XB 3.0 Guide used to engage LM  Luis Blue crossed mid LAD lesion  IVUS confirmed diffuse moderate disease with severe focal stenosis at mid LAD  Pre-dilated using 2.5 x 15mm balloon with good expansion  CYNTHIA placement (Steven 2.5 x 18mm) to mid LAD  P.E using 2.5 x 15mm NC up to 15 joselyn  Angiogram showed VIRY III flow (lesion 95% > 0%)    Monitored sedation administered by the cath lab RN, and supervised throughout the procedure by myself. Total time 35 minutes.     Closure: Radial band.    No immediate complications.    A/P:  95% mid LAD stenosis s/p IVUS guided PCI. CYNTHIA x 1  Mild diffuse disease involving RCA & LCX  Moderate diffuse disease of entire LAD with residual 70% mid-distal LAD stenosis to be managed medically  Right dominant circulation  LVEDP 10  DAPT, statin  CTU  Tentative DC tomorrow      Rosalind Perez MD  Interventional Cardiology  [facility]

## 2024-05-28 NOTE — PLAN OF CARE
Pt alert and oriented x 4.  Wife at bedside.  Continuous tele monitoring in place.  NSR on the monitor.  Denies pain, dyspnea and dizziness.  Room air.  Cardiac cath completed with 1 stent to LAD.  Right radial access site with TR band in place. Pulse +2.  No sign of bleeding.  VSS.  Safety and comfort maintained.  Will continue to monitor.  Per cardiology, probable dc tomorrow.     Problem: CARDIOVASCULAR - ADULT  Goal: Maintains optimal cardiac output and hemodynamic stability  Description: INTERVENTIONS:  - Monitor vital signs, rhythm, and trends  - Monitor for bleeding, hypotension and signs of decreased cardiac output  - Evaluate effectiveness of vasoactive medications to optimize hemodynamic stability  - Monitor arterial and/or venous puncture sites for bleeding and/or hematoma  - Assess quality of pulses, skin color and temperature  - Assess for signs of decreased coronary artery perfusion - ex. Angina  - Evaluate fluid balance, assess for edema, trend weights  5/28/2024 1256 by Marcia Gomez RN  Outcome: Progressing  5/28/2024 1216 by Marcia Gomez RN  Outcome: Progressing  Goal: Absence of cardiac arrhythmias or at baseline  Description: INTERVENTIONS:  - Continuous cardiac monitoring, monitor vital signs, obtain 12 lead EKG if indicated  - Evaluate effectiveness of antiarrhythmic and heart rate control medications as ordered  - Initiate emergency measures for life threatening arrhythmias  - Monitor electrolytes and administer replacement therapy as ordered  5/28/2024 1256 by Marcia Gomez RN  Outcome: Progressing  5/28/2024 1216 by Marcia Gomez RN  Outcome: Progressing     Problem: CARDIOVASCULAR - ADULT  Goal: Absence of cardiac arrhythmias or at baseline  Description: INTERVENTIONS:  - Continuous cardiac monitoring, monitor vital signs, obtain 12 lead EKG if indicated  - Evaluate effectiveness of antiarrhythmic and heart rate control medications as ordered  - Initiate emergency  measures for life threatening arrhythmias  - Monitor electrolytes and administer replacement therapy as ordered  5/28/2024 1256 by Marcia Gomez, RN  Outcome: Progressing  5/28/2024 1216 by Marcia Gomez, RN  Outcome: Progressing

## 2024-05-28 NOTE — PROGRESS NOTES
Progress Note  John Mojica Patient Status:  Inpatient    1940 MRN [id number]   Location [facility] 2NE-A Attending Jamie Hopson MD   Hosp Day # 4 PCP No primary care provider on file.     Subjective:  No cardiac events over night.     Objective:  /85 (BP Location: Left arm)   Pulse 67   Temp 97.8 °F (36.6 °C) (Oral)   Resp 18   Ht 6' (1.829 m)   Wt 179 lb 7.3 oz (81.4 kg)   SpO2 94%   BMI 24.34 kg/m²     Telemetry: SR 67 BPM.      Intake/Output:    Intake/Output Summary (Last 24 hours) at 2024 0956  Last data filed at 2024 0825  Gross per 24 hour   Intake 240 ml   Output 350 ml   Net -110 ml       Last 3 Weights   24 179 lb 7.3 oz (81.4 kg)   24 1220 175 lb (79.4 kg)       Labs:  Recent Labs   Lab 24  0607 24  1537 24  0749 24  0711   *  --  103* 95   BUN 20  --  19 22   CREATSERUM 0.74  --  0.72 0.87   EGFRCR 90  --  91 86   CA 8.4*  --  8.6 8.7     --  139 138   K 3.6 4.4 4.0 4.0     --  111 111   CO2 23.0  --  21.0 22.0     Recent Labs   Lab 24  1222 24  2234 24  0607 24  0748 24  0626 24  0710   RBC 4.63 4.35 4.32  --   --  4.49   HGB 15.3 14.3 14.2  --   --  14.6   HCT 44.3 41.6 40.4  --   --  43.2   MCV 95.7 95.6 93.5  --   --  96.2   MCH 33.0 32.9 32.9  --   --  32.5   MCHC 34.5 34.4 35.1  --   --  33.8   RDW 13.2 12.9 12.9  --   --  12.7   NEPRELIM 4.04  --  3.62  --   --   --    WBC 5.7 8.5 5.5  --   --  5.0   .0 188.0 167.0 179.0 170.0 179.0         Recent Labs   Lab 24  0749   TROPHS 789* 608* 108*   24: EKG: ST LBBB PVC's  BPM, QRS: 434 ms, MO: 166 ms.   24:  Echo:  LVEF:  Reportedly 25-30 %- Pending reading.   Review of Systems:   Constitutional: No fevers, chills, fatigue or night sweats.  ENT: No mouth pain, neck pain, running nose, headaches or swollen glands.  Skin: No rashes, pruritus or skin  changes,  Respiratory: Denies cough, wheezing or shortness of breath.  CV: Denies chest pain, palpitations, orthopnea, PND or dizziness.  Musculoskeletal: No joint pain, stiffness or swelling.  GI: No nausea, vomiting or diarrhea. No blood in stools.  Neurologic: No seizures, tremors, weakness or numbness.     Physical Exam:  Gen: alert, oriented x 3, NAD  Heent: pupils equal, reactive. Mucous membranes moist.   Neck: no jvd  Cardiac: regular rate and rhythm, normal S1,S2  Lungs: CTA  Abd: soft, NT/ND +bs  Ext: no edema  Skin: Warm, dry  Neuro: No focal deficits    Medications:     sodium chloride   Intravenous On Call    atorvastatin  40 mg Oral Nightly    losartan  12.5 mg Oral Daily    aspirin  325 mg Oral Daily    metoprolol tartrate  25 mg Oral 2x Daily(Beta Blocker)    spironolactone  12.5 mg Oral Daily    PHENobarbital  194.4 mg Oral Nightly      continuous dose heparin 1,000 Units/hr (05/28/24 0000)     Assessment:  NSTEMI:  Trops: 789 > 608> 108.  No acute ST changes.   LBBB- duration unclear.   No anginal symptoms.   ASA, Heparin Gt, BB, statin.   Hypertensive Urgency:  197/90 in ER.   Renal artery US: No ANNA.   Controlled on BB, Losartan and aldactone.   Diminished LVEF:  25-30 %. No comparison.   BB, Losartan , Aldactone.   Compensated.   Dyslipidemia:  HDL: 79, LDL: 159. Atorvastatin 40 mg every day.   Seizure Disorder: Phenobarbital.     Plan:  Recent NSTEMI, which may be secondary to hypertensive urgency, with diminished EF.   Continue Heparin Gtt.   Continue GDMT.   Await R and L HC today.     Plan of care discussed with patient, KAILYN.    AC Whipple  5/28/2024  9:56 AM  [phone]  [facility] [phone]

## 2024-05-29 VITALS
TEMPERATURE: 98 F | RESPIRATION RATE: 18 BRPM | HEART RATE: 77 BPM | SYSTOLIC BLOOD PRESSURE: 147 MMHG | BODY MASS INDEX: 24.3 KG/M2 | HEIGHT: 72 IN | OXYGEN SATURATION: 94 % | WEIGHT: 179.44 LBS | DIASTOLIC BLOOD PRESSURE: 81 MMHG

## 2024-05-29 LAB
ANION GAP SERPL CALC-SCNC: 10 MMOL/L (ref 0–18)
APTT PPP: 28.6 SECONDS (ref 23–36)
ATRIAL RATE: 65 BPM
BUN BLD-MCNC: 18 MG/DL (ref 9–23)
CALCIUM BLD-MCNC: 8.7 MG/DL (ref 8.5–10.1)
CHLORIDE SERPL-SCNC: 108 MMOL/L (ref 98–112)
CO2 SERPL-SCNC: 19 MMOL/L (ref 21–32)
CREAT BLD-MCNC: 0.92 MG/DL
EGFRCR SERPLBLD CKD-EPI 2021: 83 ML/MIN/1.73M2 (ref 60–?)
ERYTHROCYTE [DISTWIDTH] IN BLOOD BY AUTOMATED COUNT: 12.8 %
GLUCOSE BLD-MCNC: 89 MG/DL (ref 70–99)
HCT VFR BLD AUTO: 40.7 %
HGB BLD-MCNC: 14.5 G/DL
MCH RBC QN AUTO: 33 PG (ref 26–34)
MCHC RBC AUTO-ENTMCNC: 35.6 G/DL (ref 31–37)
MCV RBC AUTO: 92.5 FL
OSMOLALITY SERPL CALC.SUM OF ELEC: 285 MOSM/KG (ref 275–295)
P AXIS: 16 DEGREES
P-R INTERVAL: 144 MS
PLATELET # BLD AUTO: 168 10(3)UL (ref 150–450)
POTASSIUM SERPL-SCNC: 3.8 MMOL/L (ref 3.5–5.1)
Q-T INTERVAL: 400 MS
QRS DURATION: 104 MS
QTC CALCULATION (BEZET): 416 MS
R AXIS: 32 DEGREES
RBC # BLD AUTO: 4.4 X10(6)UL
SODIUM SERPL-SCNC: 137 MMOL/L (ref 136–145)
T AXIS: -78 DEGREES
VENTRICULAR RATE: 65 BPM
WBC # BLD AUTO: 7 X10(3) UL (ref 4–11)

## 2024-05-29 PROCEDURE — 99239 HOSP IP/OBS DSCHRG MGMT >30: CPT | Performed by: HOSPITALIST

## 2024-05-29 RX ORDER — ATORVASTATIN CALCIUM 40 MG/1
40 TABLET, FILM COATED ORAL NIGHTLY
Qty: 30 TABLET | Refills: 5 | Status: SHIPPED | OUTPATIENT
Start: 2024-05-29 | End: 2024-05-29

## 2024-05-29 RX ORDER — ATORVASTATIN CALCIUM 80 MG/1
80 TABLET, FILM COATED ORAL NIGHTLY
Status: DISCONTINUED | OUTPATIENT
Start: 2024-05-29 | End: 2024-05-29

## 2024-05-29 RX ORDER — METOPROLOL SUCCINATE 50 MG/1
50 TABLET, EXTENDED RELEASE ORAL DAILY
Qty: 30 TABLET | Refills: 5 | Status: SHIPPED | OUTPATIENT
Start: 2024-05-29

## 2024-05-29 RX ORDER — ASPIRIN 81 MG/1
81 TABLET ORAL DAILY
Qty: 30 TABLET | Refills: 11 | Status: SHIPPED | OUTPATIENT
Start: 2024-05-30

## 2024-05-29 RX ORDER — ATORVASTATIN CALCIUM 80 MG/1
80 TABLET, FILM COATED ORAL NIGHTLY
Qty: 30 TABLET | Refills: 5 | Status: SHIPPED | OUTPATIENT
Start: 2024-05-29

## 2024-05-29 RX ORDER — POTASSIUM CHLORIDE 20 MEQ/1
40 TABLET, EXTENDED RELEASE ORAL ONCE
Status: COMPLETED | OUTPATIENT
Start: 2024-05-29 | End: 2024-05-29

## 2024-05-29 RX ORDER — LOSARTAN POTASSIUM 25 MG/1
12.5 TABLET ORAL DAILY
Qty: 30 TABLET | Refills: 5 | Status: SHIPPED | OUTPATIENT
Start: 2024-05-30

## 2024-05-29 RX ORDER — SPIRONOLACTONE 25 MG/1
12.5 TABLET ORAL DAILY
Qty: 30 TABLET | Refills: 5 | Status: SHIPPED | OUTPATIENT
Start: 2024-05-30

## 2024-05-29 NOTE — DISCHARGE INSTRUCTIONS
HOME CARE INSTRUCTIONS FOLLOWING CORONARY ANGIOGRAPHY, ANGIOPLASTY (PTCA/PTA) OR INSERTION OF STENT IN THE CORONARY ARTERIES        Activity  DO NOT drive after the procedure.  You may resume driving late the following day according to the nurse or physician's instructions  Plan on resting and relaxing tonight and tomorrow  Resume your normal activity after 48 hours, or as instructed by your physician  Do not lift anything over 10 pounds for the next 24 hours  Avoid drinking alcohol for the next 24 hours  If the groin site was used, avoid repeated stair use and excessive walking for the next 24 hours  If the wrist was used, avoid bending/flexing of the wrist for the next 24 hours     What is Normal?  A small lump at the procedure site associated with mild tenderness when touched  The procedure site may be bruised or discolored  There may be a small amount of drainage on the bandage     Special Instructions  Drink plenty of fluids during the next 24 hours to \"flush\" the contrast from your system  The bandage is to remain in place for 24 hours  Keep the bandage clean and dry  DO NOT submerge the procedure site for 72 hours (no bath tubs or pools)  This includes dishwashing/submersion of the wrist, if the wrist was used  After 24 hours, you must remove the bandage  You should shower after removing the bandage, and wash the procedure site gently with soap and water  If you choose to wear a bandage for a few days, make sure it remains clean and dry and that it is changed daily     When you should NOTIFY YOUR PHYSICIAN  Bleeding can occur at the procedure site - both on the outside of the skin and/or beneath the surface of the skin  Swelling or a large lump at the procedure site can occur, which may be accompanied by moderate to severe pain     If either of the above occurs, lie down flat.  Have someone apply pressure to the procedure site with both hands, as instructed by the nurse.  Hold pressure for 20 minutes and  the bleeding should stop.  Notify your physician of the occurrence  If the bleeding does not stop, call 911 and continue to apply pressure     If you experience signs of a fever, temperature > 101°, chills, infection (redness, swelling, thick yellow drainage, or a foul odor from the procedure site)  If you notice any numbness, tingling, or loss of feeling to your leg or foot or groin access  If you notice any numbness, tingling, or loss of feeling to your fingers or hand, if wrist access was utilized     If You Received a Stent:     You will remain on an antiplatelet drug and/or aspirin.  Antiplatelet medications are usually taken for six months to one year and should not be stopped unless your cardiologist directs you to do so.  These medications help to prevent blockage at the stent site.  If another physician or dentist asks you to stop your antiplatelet medication, you need to consult your cardiologist first.  Together, your cardiologist and other physician can discuss the risks that may be involved if you are not taking the antiplatelet medication   If an MRI is necessary, it may be done 4-6 weeks after your procedure.  Verify this with your cardiologist  Keep your stent card with you at all times!  If you need an MRI in the future, your stent card will need to be shown to the technologist before performing the MRI.  A duplicate card CANNOT be reproduced.     Other  You may resume your present diet, unless otherwise specified by your physician.  You may resume all of your medications as prescribed, unless otherwise directed by your physician.  A list of your medications was provided to you at discharge.  Continue the walking program initiated in the hospital and progress your walking as directed.  Or, gradually resume your previous aerobic exercise schedule as tolerated.  Please call your physician’s office for a follow-up appointment.  You should be seen in 2 weeks.

## 2024-05-29 NOTE — PROGRESS NOTES
Progress Note  John Mojica Patient Status:  Inpatient    1940 MRN [id number]   Location [facility] 2NE-A Attending Jonah Curtis DO   Hosp Day # 5 PCP No primary care provider on file.     Subjective:  No complaints of chest pain or SOB    Objective:  /78 (BP Location: Left arm)   Pulse 80   Temp 98.7 °F (37.1 °C) (Oral)   Resp 18   Ht 6' (1.829 m)   Wt 179 lb 7.3 oz (81.4 kg)   SpO2 93%   BMI 24.34 kg/m²     Telemetry: SR, PVC's      Intake/Output: -1181        Last 3 Weights   24 179 lb 7.3 oz (81.4 kg)   24 1220 175 lb (79.4 kg)       Labs:  Recent Labs   Lab 24  0749 24  0711 24  0555   *   < > 103* 95 89   BUN 23   < > 19 22 18   CREATSERUM 0.94   < > 0.72 0.87 0.92   EGFRCR 81   < > 91 86 83   CA 9.4   < > 8.6 8.7 8.7   ALB 4.0  --   --   --   --       < > 139 138 137   K 4.0   < > 4.0 4.0 3.8      < > 111 111 108   CO2 26.0   < > 21.0 22.0 19.0*   ALKPHO 64  --   --   --   --    AST 18  --   --   --   --    ALT 41  --   --   --   --    BILT 0.4  --   --   --   --    TP 7.9  --   --   --   --     < > = values in this interval not displayed.     Recent Labs   Lab 24  0607 24  0748 24  0626 24  0710 24  0555   RBC 4.63   < > 4.32  --   --  4.49 4.40   HGB 15.3   < > 14.2  --   --  14.6 14.5   HCT 44.3   < > 40.4  --   --  43.2 40.7   MCV 95.7   < > 93.5  --   --  96.2 92.5   MCH 33.0   < > 32.9  --   --  32.5 33.0   MCHC 34.5   < > 35.1  --   --  33.8 35.6   RDW 13.2   < > 12.9  --   --  12.7 12.8   NEPRELIM 4.04  --  3.62  --   --   --   --    WBC 5.7   < > 5.5  --   --  5.0 7.0   .0   < > 167.0   < > 170.0 179.0 168.0    < > = values in this interval not displayed.         Recent Labs   Lab 24  0749   TROPHS 789* 608* 108*     No results found for: \"PT\", \"INR\"    Diagnostics:     Review of Systems    Constitutional: Negative.   Cardiovascular: Negative.    Respiratory: Negative.         Physical Exam:    Gen: Alert, oriented x 3, in no apparent distress  Heent: Pupils equal, reactive. Mucous membranes moist.   Cardiac: Regular rate and rhythm, normal S1,S2  Lungs: Clear to auscultation  Abd: Soft, non tender, non distended  Ext: No edema  Skin: Warm, dry  Neuro: No focal deficits  Right wrist, no hematoma      Medications:     ticagrelor  90 mg Oral Q12H    aspirin  81 mg Oral Daily    atorvastatin  40 mg Oral Nightly    losartan  12.5 mg Oral Daily    metoprolol tartrate  25 mg Oral 2x Daily(Beta Blocker)    spironolactone  12.5 mg Oral Daily    PHENobarbital  194.4 mg Oral Nightly      continuous dose heparin 1,000 Units/hr (05/28/24 0000)           Assessment:  NSTEMI, peak 798  S/p LHC with 95% mid LAD stenosis, s/p IVUS, PCI, CYNTHIA x1, mild diffuse disease RCA, LCX, mod entire LAD with residual 70% mid-distal LAD stensosis to be managed medically  On ASA, brilinta, BB, ARB, rubina, statin   Hypertensive urgency  Renal US no ANNA  On BB, ARB, rubina  LV dysfunction, LVEF 35-30%  On BB, ARB, aldactone  Dyslipidemia  on statin   PVC's  Seizure disorder     Plan:  Continue ASA/brilinta, BB, ARB, rubina, statin   Discharge planning to home today. Follow up Dr. Perez    Plan of care discussed with patient, RN.    MERCED Hayden  5/29/2024  10:29 AM      Patient seen and examined independently.  Note reviewed and labs reviewed.  Agree to the assessment and plan with the following additions/changes.  Entire medical decision making & plan performed by myself.      A/P:  NSTEMI in the setting of HTN urgency. Likely demand ischemia  LHC showed >90% mid LAD stenosis, with moderate diffuse disease. Underwent PCI w/ CYNTHIA x 1  Also has HFrEF, LVEF 30-35%. Will require optimization of GDMT  Continue DAPT  LDL 150s, increase atorvastatin to 80mg PO QD  Continue BB, ARB, MRA  Plan to switch to losartan to Entresto  outpt  Okay for DC today    LOC L3    Rosalind Perez MD  5/29/2024  Interventional Cardiology  [facility]  =======================================================

## 2024-05-29 NOTE — PLAN OF CARE
Received pt at shift change. AxOx4. Room air. Denies pain/SOB. Vitals stable. NSR on tele.   See flowsheets for additional assessments.   Pt updated on POC. Call light within reach. All needs met at this time.     Plan:  -Discharge today    Problem: Patient/Family Goals  Goal: Patient/Family Long Term Goal  Description: Patient's Long Term Goal: Stay out of the hospital    Interventions:  - Take all meds as prescribed  -Follow up with PCP  -Follow up with CARDS  - See additional Care Plan goals for specific interventions  Outcome: Progressing  Goal: Patient/Family Short Term Goal  Description: Patient's Short Term Goal: Feel better    Interventions:   - ECHO  -US kidney  -HEP gtt  -Hourly rounds  -Assessment  - See additional Care Plan goals for specific interventions  Outcome: Progressing     Problem: CARDIOVASCULAR - ADULT  Goal: Maintains optimal cardiac output and hemodynamic stability  Description: INTERVENTIONS:  - Monitor vital signs, rhythm, and trends  - Monitor for bleeding, hypotension and signs of decreased cardiac output  - Evaluate effectiveness of vasoactive medications to optimize hemodynamic stability  - Monitor arterial and/or venous puncture sites for bleeding and/or hematoma  - Assess quality of pulses, skin color and temperature  - Assess for signs of decreased coronary artery perfusion - ex. Angina  - Evaluate fluid balance, assess for edema, trend weights  Outcome: Progressing  Goal: Absence of cardiac arrhythmias or at baseline  Description: INTERVENTIONS:  - Continuous cardiac monitoring, monitor vital signs, obtain 12 lead EKG if indicated  - Evaluate effectiveness of antiarrhythmic and heart rate control medications as ordered  - Initiate emergency measures for life threatening arrhythmias  - Monitor electrolytes and administer replacement therapy as ordered  Outcome: Progressing

## 2024-05-29 NOTE — DISCHARGE SUMMARY
[facility]     John Mojica Patient Status:  Inpatient    1940 MRN [id number]   Location [facility] 2NE-A Attending Jonah Curtis DO   Hosp Day # 5 PCP No primary care provider on file.     Date of Admission: 2024  Date of Discharge:   2024    Discharge Disposition: Home    Discharge Diagnosis:  NSTEMI  CAD  Hypertensive urgency  Systolic dysfunction  Dyslipidemia  Seizure disorder     History of Present Illness: John Mojica is a 83 year old male with past medical history hypertension, seizure disorder who presents with chest pain.  Started several days ago and occurs intermittently.  Last about 10 minutes.  Nonexertional.  Nonpleuritic.  Intensity and frequency have been increasing over the last couple of days.  Denies any fever, chills, diaphoresis, nausea/vomiting, shortness of breath, palpitations, abdominal pain.     Brief Synopsis: Patient admitted with NSTEMI. EF found to be depressed on echo to 35-40%. Cardiology consulted. Patient underwent cardiac cath with PCI/CYNTHIA to LAD. Patient started on GDMT per cardiology. Patient discharged home in stable condition.     Lace+ Score: 55  59-90 High Risk  29-58 Medium Risk  0-28   Low Risk  Patient was referred to the [facility].    TCM Follow-Up Recommendation:  LACE 29-58: Moderate Risk of readmission after discharge from the hospital.      Procedures during hospitalization:   Cardiac cath    Incidental or significant findings and recommendations (brief descriptions):  None    Lab/Test results pending at Discharge:   None    Consultants:  Cardiology    Discharge Medication List:     Discharge Medications        START taking these medications        Instructions Prescription details   aspirin 81 MG Tbec  Start taking on: May 30, 2024      Take 1 tablet (81 mg total) by mouth daily.   Quantity: 30 tablet  Refills: 11     atorvastatin 80 MG Tabs  Commonly known as: Lipitor      Take 1 tablet (80  mg total) by mouth nightly.   Quantity: 30 tablet  Refills: 5     losartan 25 MG Tabs  Commonly known as: Cozaar  Start taking on: May 30, 2024      Take 0.5 tablets (12.5 mg total) by mouth daily.   Quantity: 30 tablet  Refills: 5     metoprolol succinate ER 50 MG Tb24  Commonly known as: Toprol XL      Take 1 tablet (50 mg total) by mouth daily.   Quantity: 30 tablet  Refills: 5     spironolactone 25 MG Tabs  Commonly known as: Aldactone  Start taking on: May 30, 2024      Take 0.5 tablets (12.5 mg total) by mouth daily.   Quantity: 30 tablet  Refills: 5     ticagrelor 90 MG Tabs  Commonly known as: Brilinta      Take 1 tablet (90 mg total) by mouth every 12 (twelve) hours.   Quantity: 90 tablet  Refills: 11            CONTINUE taking these medications        Instructions Prescription details   PHENOBARBITAL OR      Take 180 mg by mouth at bedtime.   Refills: 0            STOP taking these medications      UNKNOWN TO PATIENT - BLOOD PRESSURE                  Where to Get Your Medications        Please  your prescriptions at the location directed by your doctor or nurse    Bring a paper prescription for each of these medications  aspirin 81 MG Tbec  atorvastatin 80 MG Tabs  losartan 25 MG Tabs  metoprolol succinate ER 50 MG Tb24  spironolactone 25 MG Tabs  ticagrelor 90 MG Tabs         [place]: [place]    Follow-up appointment:   Jermaine Gr  [phone] (Work)  [phone] (Fax)  [address] [place] [address]  Call in 4 day(s)      Rosalind Perez MD  [address] [address] [phone]    Schedule an appointment as soon as possible for a visit  in 1-2 weeks    Appointments for Next 30 Days 5/29/2024 - 6/28/2024        Date Arrival Time Visit Type Length Department Provider     6/27/2024  2:30 PM  [facility] CARDIAC REHAB PHASE II INI [2138] 60 min [facility] Cardiopulmonary Rehabilitation CARD PULM INITIAL    Patient Instructions:     Check with Insurance for  coverage                                      First visit 1 1/2 hours                                                                   Dress comfortably                                                                                                                                                Location Instructions:     Your appointment is scheduled at [facility]. The address is&nbsp; [address]. To reach Registration, park in the [facility]. Go through the entrance doors located on the ground floor. Veer left past the Information Desk and proceed through the lobby past the staircase to check in at the Cardiopulmonary Rehab Registration desk.  Masks are optional for all patients and visitors, unless otherwise indicated.                      Vital signs:  Temp:  [97.5 °F (36.4 °C)-98.7 °F (37.1 °C)] 97.7 °F (36.5 °C)  Pulse:  [68-80] 77  Resp:  [18-20] 18  BP: (120-151)/(69-88) 147/81  SpO2:  [92 %-99 %] 94 %    Physical Exam:    General: No acute distress   Lungs: clear to auscultation  Cardiovascular: S1, S2  Abdomen: Soft    -----------------------------------------------------------------------------------------------  PATIENT DISCHARGE INSTRUCTIONS: See electronic chart    Jonah Curtis DO    Total time spent on discharge plannin minutes     The 21st Century Cures Act makes medical notes like these available to patients in the interest of transparency. Please be advised this is a medical document. Medical documents are intended to carry relevant information, facts as evident, and the clinical opinion of the practitioner. The medical note is intended as peer to peer communication and may appear blunt or direct. It is written in medical language and may contain abbreviations or verbiage that are unfamiliar.

## 2024-05-29 NOTE — PLAN OF CARE
Assumed care of patient at 1930  A&Ox4. Room Air. NSR on tele. Right wrist TR band removed at start of shift, gauze/tegaderm dressing applied. Site remains clean/dry/soft. Continent, voiding without issue. Denies pain. Ambulating with minimal assist. Frequent rounding and fall precautions in place. Pt updated and agrees with plan of care.    Problem: Patient/Family Goals  Goal: Patient/Family Long Term Goal  Description: Patient's Long Term Goal: Stay out of the hospital    Interventions:  - Take all meds as prescribed  -Follow up with PCP  -Follow up with CARDS  - See additional Care Plan goals for specific interventions  Outcome: Progressing  Goal: Patient/Family Short Term Goal  Description: Patient's Short Term Goal: Feel better    Interventions:   - ECHO  -US kidney  -HEP gtt  -Hourly rounds  -Assessment  - See additional Care Plan goals for specific interventions  Outcome: Progressing

## 2024-05-29 NOTE — PROGRESS NOTES
Explained discharge instructions including medications and follow-ups to the pt. Medications in hand from [facility]. Verbalized understanding, IV removed, tele monitor discontinued. Will be transported via wheelchair.

## 2024-05-30 ENCOUNTER — PATIENT OUTREACH (OUTPATIENT)
Dept: CASE MANAGEMENT | Age: 84
End: 2024-05-30

## 2024-05-30 NOTE — PROGRESS NOTES
Initial Post Discharge Follow Up   Discharge Date: 5/29/24  Contact Date: 5/30/2024    Consent Verification:  Assessment Completed With: Patient  HIPAA Verified?  Yes    Discharge Dx:   Chest pain of uncertain etiology     General:   How have you been since your discharge from the hospital?  Im feeling great. Better than I have in over 6 months.  Do you have any pain since discharge?  No    When you were leaving the hospital were your discharge instructions reviewed with you? Yes  How well were your discharge instructions explained to you?   On a scale of 1-5   1- Very Poor and 5- Very well   Very Well  Do you have any questions about your discharge instructions?  No  Before leaving the hospital was your diagnoses explained to you? Yes  Do you have any questions about your diagnoses? No  Are you able to perform normal daily activities of living as you have prior to your hospital stay (dressing, bathing, ambulating to the bathroom, etc)? yes  (NCM) Was patient given a different diet per AVS? no      Medications:   Current Outpatient Medications   Medication Sig Dispense Refill    aspirin 81 MG Oral Tab EC Take 1 tablet (81 mg total) by mouth daily. 30 tablet 11    losartan 25 MG Oral Tab Take 0.5 tablets (12.5 mg total) by mouth daily. 30 tablet 5    metoprolol succinate ER 50 MG Oral Tablet 24 Hr Take 1 tablet (50 mg total) by mouth daily. 30 tablet 5    spironolactone 25 MG Oral Tab Take 0.5 tablets (12.5 mg total) by mouth daily. 30 tablet 5    ticagrelor 90 MG Oral Tab Take 1 tablet (90 mg total) by mouth every 12 (twelve) hours. 90 tablet 11    atorvastatin 80 MG Oral Tab Take 1 tablet (80 mg total) by mouth nightly. 30 tablet 5    PHENOBARBITAL OR Take 180 mg by mouth at bedtime.       Were there any changes to your current medication(s) noted on the AVS? Yes  If so, were these medication changes discussed with you prior to leaving the hospital? Yes  If a new medication was prescribed:    Was the new  medication's purpose & side effects reviewed? Yes  Do you have any questions about your new medication? No  Did you  your discharge medications when you left the hospital? Yes  Let's go over your medications together to make sure we are not missing anything. Medications Reviewed  Are there any reasons that keep you from taking your medication as prescribed? No  Are you having any concerns with constipation? No      Discharge medications reviewed/discussed/and reconciled against outpatient medications with patient.  Any changes or updates to medications sent to PCP.  Patient Acknowledged     Referrals/orders at D/C:  Referrals/orders placed at D/C? no    DME ordered at D/C? No      Discharge orders, AVS reviewed and discussed with patient. Any changes or updates to orders sent to PCP.  Patient Acknowledged      SDOH:   Transportation Needs: No Transportation Needs (5/24/2024)    Transportation Needs     Lack of Transportation: No     Car Seat: Not on file     Financial Resource Strain: Low Risk  (5/30/2024)    Financial Resource Strain     Difficulty of Paying Living Expenses: Not hard at all     Med Affordability: No     Have you been experiencing any symptoms since you returned home from the hospital?       No  Any shortness of breath?  no  Did you have a procedure (cardiac cath or angiogram) while in the hospital? yes    If yes, is the site healed?    yes    Have you scheduled an appointment with Cardiac Rehab?  yes       If Yes, when: 6/27/24      Follow up appointments:      Your appointments       Date & Time Appointment Department (Center)    Jun 27, 2024 2:30 PM CDT CARDIAC REHAB PHASE II INITIAL with CHAPARRITA AZAR [facility] Cardiopulmonary Rehabilitation ([facility])    Check with Insurance for coverage                                      First visit 1 1/2 hours                                                                   Dress comfortably                                                                                                                                                       [facility] Cardiopulmonary Rehabilitation  [facility]  [address] [address]  [phone]            TCC  Was TCC ordered: Yes  Was TCC scheduled: No, Explain-pt prefers to see PCP      PCP (If no TCC appointment)  Does patient already have a PCP appointment scheduled? No, pt states that he plans to call his PCP office on Monday (Dr. Jermaine Gr)      Specialist    Does the patient have any other follow up appointment(s) needing to be scheduled? Yes  If yes: NCM reviewed upcoming specialist appointment with patient: Yes  Does the patient need assistance scheduling appointment(s): No, pt states that he will call to schedule follow up with cards.     Is there any reason as to why you cannot make your appointment(s)?  No     Needs post D/C:   Now that you are home, are there any needs or concerns you need addressed before your next visit with your PCP?  (DME, meds, questions, etc.): No    Interventions by NCM:   NCM reviewed discharge instructions and when to seek medical attention with the patient. He states that he is feeling the best he has in at least 6 months.He states that he is currently at work and feels great.  NCM instructed on s/s of infection; he v/u and states that the right wrist is healed. He denied having any fever, n/v/c/d, sob, pain, lightheadedness, HA or any other symptoms. Med review completed. He denied having any questions or concerns at this time.     Overall Rating:   How would you rate the care you received while in the hospital? excellent, pt states that all the staff was great and he had a wonderful experience.     CCM referral placed:    No    BOOK BY DATE: 6/12/24

## 2024-05-31 ENCOUNTER — TELEPHONE (OUTPATIENT)
Dept: INTERNAL MEDICINE | Age: 84
End: 2024-05-31

## 2024-06-02 ENCOUNTER — E-ADVICE (OUTPATIENT)
Dept: OTHER | Age: 84
End: 2024-06-02

## 2024-06-05 RX ORDER — SPIRONOLACTONE 25 MG/1
12.5 TABLET ORAL DAILY
COMMUNITY
Start: 2024-05-30

## 2024-06-05 RX ORDER — ATORVASTATIN CALCIUM 80 MG/1
80 TABLET, FILM COATED ORAL DAILY
COMMUNITY
Start: 2024-05-29

## 2024-06-05 RX ORDER — METOPROLOL SUCCINATE 50 MG/1
1 TABLET, EXTENDED RELEASE ORAL DAILY
COMMUNITY
Start: 2024-05-29

## 2024-06-05 RX ORDER — LOSARTAN POTASSIUM 25 MG/1
12.5 TABLET ORAL DAILY
COMMUNITY
Start: 2024-05-30

## 2024-06-05 RX ORDER — ASPIRIN 81 MG/1
1 TABLET ORAL DAILY
COMMUNITY
Start: 2024-05-30

## 2024-06-06 ENCOUNTER — APPOINTMENT (OUTPATIENT)
Dept: FAMILY MEDICINE | Age: 84
End: 2024-06-06

## 2024-06-06 VITALS
WEIGHT: 183.6 LBS | OXYGEN SATURATION: 98 % | BODY MASS INDEX: 24.9 KG/M2 | DIASTOLIC BLOOD PRESSURE: 70 MMHG | HEART RATE: 72 BPM | RESPIRATION RATE: 20 BRPM | SYSTOLIC BLOOD PRESSURE: 145 MMHG | TEMPERATURE: 96.9 F

## 2024-06-06 DIAGNOSIS — I10 PRIMARY HYPERTENSION: ICD-10-CM

## 2024-06-06 DIAGNOSIS — I21.4 NSTEMI (NON-ST ELEVATED MYOCARDIAL INFARCTION)  (CMD): Primary | ICD-10-CM

## 2024-06-06 DIAGNOSIS — R56.9 NOCTURNAL SEIZURES  (CMD): ICD-10-CM

## 2024-06-06 ASSESSMENT — PATIENT HEALTH QUESTIONNAIRE - PHQ9
2. FEELING DOWN, DEPRESSED OR HOPELESS: NOT AT ALL
CLINICAL INTERPRETATION OF PHQ2 SCORE: NO FURTHER SCREENING NEEDED
SUM OF ALL RESPONSES TO PHQ9 QUESTIONS 1 AND 2: 0
SUM OF ALL RESPONSES TO PHQ9 QUESTIONS 1 AND 2: 0
1. LITTLE INTEREST OR PLEASURE IN DOING THINGS: NOT AT ALL

## 2024-07-25 ENCOUNTER — TELEPHONE (OUTPATIENT)
Dept: INTERNAL MEDICINE | Age: 84
End: 2024-07-25

## 2024-08-19 DIAGNOSIS — R56.9 NOCTURNAL SEIZURES  (CMD): ICD-10-CM

## 2024-08-20 RX ORDER — PHENOBARBITAL 60 MG/1
60 TABLET ORAL 3 TIMES DAILY
Qty: 270 TABLET | Refills: 0 | Status: SHIPPED | OUTPATIENT
Start: 2024-08-20

## 2024-08-26 DIAGNOSIS — R56.9 NOCTURNAL SEIZURES  (CMD): ICD-10-CM

## 2024-08-27 RX ORDER — PHENOBARBITAL 60 MG/1
60 TABLET ORAL 3 TIMES DAILY
Qty: 270 TABLET | Refills: 0 | Status: SHIPPED | OUTPATIENT
Start: 2024-08-27

## 2024-08-28 ENCOUNTER — TELEPHONE (OUTPATIENT)
Dept: NEUROLOGY | Age: 84
End: 2024-08-28

## 2024-09-20 ENCOUNTER — LAB SERVICES (OUTPATIENT)
Dept: FAMILY MEDICINE | Age: 84
End: 2024-09-20

## 2024-09-20 DIAGNOSIS — I10 PRIMARY HYPERTENSION: ICD-10-CM

## 2024-09-20 DIAGNOSIS — R56.9 NOCTURNAL SEIZURES  (CMD): ICD-10-CM

## 2024-09-20 LAB
ALBUMIN SERPL-MCNC: 3.7 G/DL (ref 3.4–5)
ALBUMIN/GLOB SERPL: 1.2 {RATIO} (ref 1–2.4)
ALP SERPL-CCNC: 64 UNITS/L (ref 45–117)
ALT SERPL-CCNC: 41 UNITS/L
ANION GAP SERPL CALC-SCNC: 16 MMOL/L (ref 7–19)
AST SERPL-CCNC: 19 UNITS/L
BASOPHILS # BLD: 0 K/MCL (ref 0–0.3)
BASOPHILS NFR BLD: 1 %
BILIRUB SERPL-MCNC: 0.4 MG/DL (ref 0.2–1)
BUN SERPL-MCNC: 23 MG/DL (ref 6–20)
BUN/CREAT SERPL: 27 (ref 7–25)
CALCIUM SERPL-MCNC: 8.8 MG/DL (ref 8.4–10.2)
CHLORIDE SERPL-SCNC: 106 MMOL/L (ref 97–110)
CHOLEST SERPL-MCNC: 192 MG/DL
CHOLEST/HDLC SERPL: 2.4 {RATIO}
CO2 SERPL-SCNC: 25 MMOL/L (ref 21–32)
CREAT SERPL-MCNC: 0.86 MG/DL (ref 0.67–1.17)
DEPRECATED RDW RBC: 48.8 FL (ref 39–50)
EGFRCR SERPLBLD CKD-EPI 2021: 86 ML/MIN/{1.73_M2}
EOSINOPHIL # BLD: 0.1 K/MCL (ref 0–0.5)
EOSINOPHIL NFR BLD: 2 %
ERYTHROCYTE [DISTWIDTH] IN BLOOD: 13.5 % (ref 11–15)
FASTING DURATION TIME PATIENT: ABNORMAL H
GLOBULIN SER-MCNC: 3.1 G/DL (ref 2–4)
GLUCOSE SERPL-MCNC: 101 MG/DL (ref 70–99)
HCT VFR BLD CALC: 39 % (ref 39–51)
HDLC SERPL-MCNC: 81 MG/DL
HGB BLD-MCNC: 13.2 G/DL (ref 13–17)
IMM GRANULOCYTES # BLD AUTO: 0 K/MCL (ref 0–0.2)
IMM GRANULOCYTES # BLD: 1 %
LDLC SERPL CALC-MCNC: 101 MG/DL
LYMPHOCYTES # BLD: 1.3 K/MCL (ref 1–4)
LYMPHOCYTES NFR BLD: 23 %
MCH RBC QN AUTO: 32.9 PG (ref 26–34)
MCHC RBC AUTO-ENTMCNC: 33.8 G/DL (ref 32–36.5)
MCV RBC AUTO: 97.3 FL (ref 78–100)
MONOCYTES # BLD: 0.7 K/MCL (ref 0.3–0.9)
MONOCYTES NFR BLD: 12 %
NEUTROPHILS # BLD: 3.5 K/MCL (ref 1.8–7.7)
NEUTROPHILS NFR BLD: 61 %
NONHDLC SERPL-MCNC: 111 MG/DL
NRBC BLD MANUAL-RTO: 0 /100 WBC
PLATELET # BLD AUTO: 220 K/MCL (ref 140–450)
POTASSIUM SERPL-SCNC: 4.5 MMOL/L (ref 3.4–5.1)
PROT SERPL-MCNC: 6.8 G/DL (ref 6.4–8.2)
RBC # BLD: 4.01 MIL/MCL (ref 4.5–5.9)
SODIUM SERPL-SCNC: 142 MMOL/L (ref 135–145)
TRIGL SERPL-MCNC: 51 MG/DL
WBC # BLD: 5.6 K/MCL (ref 4.2–11)

## 2024-09-28 ENCOUNTER — APPOINTMENT (OUTPATIENT)
Dept: FAMILY MEDICINE | Age: 84
End: 2024-09-28

## 2024-09-28 VITALS
WEIGHT: 179 LBS | HEART RATE: 80 BPM | SYSTOLIC BLOOD PRESSURE: 145 MMHG | BODY MASS INDEX: 24.24 KG/M2 | DIASTOLIC BLOOD PRESSURE: 70 MMHG | TEMPERATURE: 97.9 F | HEIGHT: 72 IN | OXYGEN SATURATION: 98 %

## 2024-09-28 DIAGNOSIS — I10 PRIMARY HYPERTENSION: ICD-10-CM

## 2024-09-28 DIAGNOSIS — R56.9 NOCTURNAL SEIZURES  (CMD): ICD-10-CM

## 2024-09-28 DIAGNOSIS — Z00.00 MEDICARE ANNUAL WELLNESS VISIT, SUBSEQUENT: Primary | ICD-10-CM

## 2024-09-28 DIAGNOSIS — E78.00 PURE HYPERCHOLESTEROLEMIA: ICD-10-CM

## 2024-09-28 DIAGNOSIS — I25.2 HX OF NON-ST ELEVATION MYOCARDIAL INFARCTION (NSTEMI): ICD-10-CM

## 2024-09-28 PROCEDURE — G0439 PPPS, SUBSEQ VISIT: HCPCS | Performed by: STUDENT IN AN ORGANIZED HEALTH CARE EDUCATION/TRAINING PROGRAM

## 2024-09-28 PROCEDURE — 99214 OFFICE O/P EST MOD 30 MIN: CPT | Performed by: STUDENT IN AN ORGANIZED HEALTH CARE EDUCATION/TRAINING PROGRAM

## 2024-09-28 RX ORDER — SACUBITRIL AND VALSARTAN 24; 26 MG/1; MG/1
1 TABLET, FILM COATED ORAL 2 TIMES DAILY
COMMUNITY
Start: 2024-09-11

## 2024-09-28 ASSESSMENT — PATIENT HEALTH QUESTIONNAIRE - PHQ9
SUM OF ALL RESPONSES TO PHQ9 QUESTIONS 1 AND 2: 0
2. FEELING DOWN, DEPRESSED OR HOPELESS: NOT AT ALL
SUM OF ALL RESPONSES TO PHQ9 QUESTIONS 1 AND 2: 0
1. LITTLE INTEREST OR PLEASURE IN DOING THINGS: NOT AT ALL
CLINICAL INTERPRETATION OF PHQ2 SCORE: NO FURTHER SCREENING NEEDED

## 2024-10-08 ENCOUNTER — HOSPITAL ENCOUNTER (OUTPATIENT)
Dept: CV DIAGNOSTICS | Age: 84
Discharge: HOME | End: 2024-10-08
Attending: INTERNAL MEDICINE
Payer: MEDICARE

## 2024-10-08 DIAGNOSIS — I50.20 SYSTOLIC HEART FAILURE, UNSPECIFIED HF CHRONICITY (HCC): ICD-10-CM

## 2024-10-08 PROCEDURE — 93306 TTE W/DOPPLER COMPLETE: CPT | Performed by: INTERNAL MEDICINE

## 2024-10-09 PROBLEM — I25.2 HX OF NON-ST ELEVATION MYOCARDIAL INFARCTION (NSTEMI): Status: ACTIVE | Noted: 2024-10-09

## 2024-10-09 PROBLEM — I10 PRIMARY HYPERTENSION: Status: ACTIVE | Noted: 2024-10-09

## 2024-10-09 PROBLEM — R56.9: Status: ACTIVE | Noted: 2024-10-09

## 2024-12-02 DIAGNOSIS — R56.9 NOCTURNAL SEIZURES  (CMD): ICD-10-CM

## 2024-12-04 RX ORDER — PHENOBARBITAL 60 MG/1
TABLET ORAL
Qty: 270 TABLET | Refills: 3 | Status: SHIPPED | OUTPATIENT
Start: 2024-12-04

## 2025-01-17 ENCOUNTER — LAB ENCOUNTER (OUTPATIENT)
Dept: LAB | Age: 85
End: 2025-01-17
Attending: INTERNAL MEDICINE
Payer: MEDICARE

## 2025-01-17 DIAGNOSIS — I25.10 CORONARY ATHEROSCLEROSIS OF NATIVE CORONARY ARTERY: Primary | ICD-10-CM

## 2025-01-17 LAB
CHOLEST SERPL-MCNC: 179 MG/DL (ref ?–200)
FASTING PATIENT LIPID ANSWER: YES
HDLC SERPL-MCNC: 74 MG/DL (ref 40–59)
LDLC SERPL CALC-MCNC: 90 MG/DL (ref ?–100)
NONHDLC SERPL-MCNC: 105 MG/DL (ref ?–130)
TRIGL SERPL-MCNC: 80 MG/DL (ref 30–149)
VLDLC SERPL CALC-MCNC: 13 MG/DL (ref 0–30)

## 2025-01-17 PROCEDURE — 80061 LIPID PANEL: CPT

## 2025-01-17 PROCEDURE — 36415 COLL VENOUS BLD VENIPUNCTURE: CPT

## 2025-04-02 ENCOUNTER — TELEPHONE (OUTPATIENT)
Dept: FAMILY MEDICINE | Age: 85
End: 2025-04-02

## 2025-06-21 ENCOUNTER — LAB ENCOUNTER (OUTPATIENT)
Dept: LAB | Age: 85
End: 2025-06-21
Attending: INTERNAL MEDICINE
Payer: MEDICARE

## 2025-06-21 DIAGNOSIS — I25.10 CORONARY ATHEROSCLEROSIS OF NATIVE CORONARY ARTERY: Primary | ICD-10-CM

## 2025-06-21 LAB
CHOLEST SERPL-MCNC: 162 MG/DL (ref ?–200)
FASTING PATIENT LIPID ANSWER: YES
HDLC SERPL-MCNC: 84 MG/DL (ref 40–59)
LDLC SERPL CALC-MCNC: 64 MG/DL (ref ?–100)
NONHDLC SERPL-MCNC: 78 MG/DL (ref ?–130)
TRIGL SERPL-MCNC: 72 MG/DL (ref 30–149)
VLDLC SERPL CALC-MCNC: 11 MG/DL (ref 0–30)

## 2025-06-21 PROCEDURE — 80061 LIPID PANEL: CPT

## 2025-06-21 PROCEDURE — 36415 COLL VENOUS BLD VENIPUNCTURE: CPT

## 2025-08-30 DIAGNOSIS — R56.9 NOCTURNAL SEIZURES  (CMD): ICD-10-CM

## 2025-09-02 RX ORDER — PHENOBARBITAL 60 MG/1
TABLET ORAL
Qty: 270 TABLET | OUTPATIENT
Start: 2025-09-02

## 2025-10-03 ENCOUNTER — APPOINTMENT (OUTPATIENT)
Dept: FAMILY MEDICINE | Age: 85
End: 2025-10-03

## (undated) NOTE — LETTER
[address]  [address]  Consent for Procedure/Sedation  Date: 5/27/2024         Time: 1250    I hereby authorize [facility], my physician and his/her assistants (if applicable), which may include medical students, residents, and/or fellows, to perform the following surgical operation/ procedure and administer such anesthesia as may be determined necessary by my physician:  Operation/Procedure name (s)  Cardiac Catheterization, Left Ventricular Cineangiography, Bilateral Selective Coronary Angiography and/or Right Heart Catheterization; possible Percutaneous Transluminal Coronary Angioplasty, Coronary Atherectomy, Coronary Stent, Intracoronary Thrombolytic therapy, Antiplatelet therapy and/or Intravascular Ultrasound on John Mojica   2.   I recognize that during the surgical operation/procedure, unforeseen conditions may necessitate additional or different procedures than those listed above.  I, therefore, further authorize and request that the above-named surgeon, assistants, or designees perform such procedures as are, in their judgment, necessary and desirable.    3.   My surgeon/physician has discussed prior to my surgery the potential benefits, risks and side effects of this procedure; the likelihood of achieving goals; and potential problems that might occur during recuperation.  They also discussed reasonable alternatives to the procedure, including risks, benefits, and side effects related to the alternatives and risks related to not receiving this procedure.  I have had all my questions answered and I acknowledge that no guarantee has been made as to the result that may be obtained.    4.   Should the need arise during my operation/procedure, which includes change of level of care prior to discharge, I also consent to the administration of blood and/or blood products.  Further, I understand that despite careful testing and screening of blood or blood  products by collecting agencies, I may still be subject to ill effects as a result of receiving a blood transfusion and/or blood products.  The following are some, but not all, of the potential risks that can occur: fever and allergic reactions, hemolytic reactions, transmission of diseases such as Hepatitis, AIDS and Cytomegalovirus (CMV) and fluid overload.  In the event that I wish to have an autologous transfusion of my own blood, or a directed donor transfusion, I will discuss this with my physician.  Check only if Refusing Blood or Blood Products  I understand refusal of blood or blood products as deemed necessary by my physician may have serious consequences to my condition to include possible death. I hereby assume responsibility for my refusal and release the hospital, its personnel, and my physicians from any responsibility for the consequences of my refusal.          o  Refuse      5.   I authorize the use of any specimen, organs, tissues, body parts or foreign objects that may be removed from my body during the operation/procedure for diagnosis, research or teaching purposes and their subsequent disposal by hospital authorities.  I also authorize the release of specimen test results and/or written reports to my treating physician on the hospital medical staff or other referring or consulting physicians involved in my care, at the discretion of the Pathologist or my treating physician.    6.   I consent to the photographing or videotaping of the operations or procedures to be performed, including appropriate portions of my body for medical, scientific, or educational purposes, provided my identity is not revealed by the pictures or by descriptive texts accompanying them.  If the procedure has been photographed/videotaped, the surgeon will obtain the original picture, image, videotape or CD.  The hospital will not be responsible for storage, release or maintenance of the picture, image, tape or CD.    7.    I consent to the presence of a  or observers in the operating room as deemed necessary by my physician or their designees.    8.   I recognize that in the event my procedure results in extended X-Ray/fluoroscopy time, I may develop a skin reaction.    9. If I have a Do Not Attempt Resuscitation (DNAR) order in place, that status will be suspended while in the operating room, procedural suite, and during the recovery period unless otherwise explicitly stated by me (or a person authorized to consent on my behalf). The surgeon or my attending physician will determine when the applicable recovery period ends for purposes of reinstating the DNAR order.  10. Patients having a sterilization procedure: I understand that if the procedure is successful the results will be permanent and it will therefore be impossible for me to inseminate, conceive, or bear children.  I also understand that the procedure is intended to result in sterility, although the result has not been guaranteed.   11. I acknowledge that my physician has explained sedation/analgesia administration to me including the risk and benefits I consent to the administration of sedation/analgesia as may be necessary or desirable in the judgment of my physician.    I CERTIFY THAT I HAVE READ AND FULLY UNDERSTAND THE ABOVE CONSENT TO OPERATION and/or OTHER PROCEDURE.      ____________________________________       _________________________________      ______________________________  Signature of Patient         Signature of Responsible Person        Printed Name of Responsible Person   ____________________________________      _________________________________      ______________________________       Signature of Witness          Relationship to Patient                       Date                                       Time  Patient Name: John Mojica  : 1940    Reviewed: 2024   Printed: May 27, 2024  Medical Record #: [id number] Page  1 of 1